# Patient Record
Sex: MALE | Race: WHITE | Employment: FULL TIME | URBAN - METROPOLITAN AREA
[De-identification: names, ages, dates, MRNs, and addresses within clinical notes are randomized per-mention and may not be internally consistent; named-entity substitution may affect disease eponyms.]

---

## 2021-02-17 ENCOUNTER — HOSPITAL ENCOUNTER (OUTPATIENT)
Dept: PHYSICAL THERAPY | Age: 56
Discharge: HOME | End: 2021-02-17
Attending: FAMILY MEDICINE
Payer: COMMERCIAL

## 2021-02-17 DIAGNOSIS — M79.18 GLUTEAL PAIN: ICD-10-CM

## 2021-02-17 PROCEDURE — 97162 PT EVAL MOD COMPLEX 30 MIN: CPT

## 2021-02-17 NOTE — THERAPY EVALUATION
Donovan Rodgers : 1965 Primary: [id number] [facility] P* Secondary:  Therapy Center at [address] [phone]   [phone] OUTPATIENT PHYSICAL THERAPY:Initial Assessment 2021 ICD-10: Treatment Diagnosis: low back pain M54.5 ICD-10: Treatment Diagnosis: Gluteal pain M79.18 Precautions/Allergies:  
Codeine TREATMENT PLAN: 
Effective Dates: 2021 TO 2021 (90 days). Frequency/Duration: 2 times a week for 90 Day(s) MEDICAL/REFERRING DIAGNOSIS: 
Gluteal pain [M79.18] DATE OF ONSET: 5 months REFERRING PHYSICIAN: Martínez Arevalo MD MD Orders: evaluate and treat Return MD Appointment: as needed INITIAL ASSESSMENT:  Mr. Torres Guerrero is a 64 y.o. male who presents to physical therapy with left gluteal pain that started with insidious onset 5 months ago. Notes symptoms are a deep ache into his left gluteal and intermittent tightness in left hamstrings. No radicular symptoms noted into his LLE. Symptoms are aggravated with stairs, climbing ladders and repetitive movements, especially during his golf swing. He presents with left innominate dysfunction, presents with posterior innominate dysfunction, sacral restrictions, soft tissue restrictions in hip rotators and hip flexors. He would benefit from skilled physical therapy to improve overall mobility in lumbar spine and pelvis. PROBLEM LIST (Impacting functional limitations): 1. Decreased Strength 2. Decreased ADL/Functional Activities 3. Decreased Ambulation Ability/Technique 4. Decreased Balance 5. Increased Pain 6. Decreased Activity Tolerance 7. Decreased Flexibility/Joint Mobility INTERVENTIONS PLANNED: (Treatment may consist of any combination of the following) 1. Balance Exercise 2. Gait Training 3. Home Exercise Program (HEP) 4. Manual Therapy 5. Neuromuscular Re-education/Strengthening 6. Range of Motion (ROM) 7. Therapeutic Activites 8. Therapeutic Exercise/Strengthening GOALS: (Goals have been discussed and agreed upon with patient.) Discharge Goals: Time Frame: 90 days 1. Patient demonstrates independence with his HEP without verbal cueing. 2. Patient able to climb stairs and/or ladder without onset of discomfort. 3. Patient able to play 18 holes of golf without onset of left buttocks pain. 4. Improve LEFS outcome measure score from 66/80 to 75/80 to perform ADLs. OUTCOME MEASURE:  
Tool Used: Lower Extremity Functional Scale (LEFS) Score:  Initial: 66/80 Most Recent: X/80 (Date: -- ) Interpretation of Score: 20 questions each scored on a 5 point scale with 0 representing \"extreme difficulty or unable to perform\" and 4 representing \"no difficulty\". The lower the score, the greater the functional disability. 80/80 represents no disability. Minimal detectable change is 9 points. MEDICAL NECESSITY:  
· Patient is expected to demonstrate progress in strength, range of motion, balance, coordination and functional technique to increase independence with ADLs, ability to climb stairs/ladder and return to golf. . 
REASON FOR SERVICES/OTHER COMMENTS: 
· Patient continues to require skilled intervention due to challenged with daily discomfort into his left gluteals . Total Duration: PT Patient Time In/Time Out Time In: 0830 Time Out: 0930 Rehabilitation Potential For Stated Goals: Excellent Regarding Mayra Nuñez's therapy, I certify that the treatment plan above will be carried out by a therapist or under their direction. Thank you for this referral, Anne Zee, PT Referring Physician Signature: Michel Atkins MD              
 
PAIN/SUBJECTIVE:  
Initial: Pain Intensity 1: 5 Pain Location 1: Pelvis Pain Orientation 1: Right  Post Session:  5/10 HISTORY:  
History of Injury/Illness (Reason for Referral): 
 Patient notes discomfort started with an insidious onset of left gluteals and hamstrings about 5-6 months ago. He is challenged with climbing stairs, going up a ladder and playing golf/repetitive movements. Notes symptoms started while golfing. He also notes challenges with prolonged sitting and driving. Patient denies dizziness, drop attacks, numbness/tingling, bowel/bladder dysfunction and/or unexplained weight gain/loss. Past Medical History/Comorbidities:  
Mr. Dora Schmitt  has a past medical history of Acute frontal sinusitis, Depression (1/27/2014), HLD (hyperlipidemia) (1/27/2014), HTN (hypertension) (1/27/2014), Other symptoms referable to back, Other testicular hypofunction, Pain in joint, lower leg, and Shoulder pain. Mr. Dora Schmitt  has a past surgical history that includes hx orthopaedic (Right, 1999); hx orthopaedic (Right, 2012); hx orthopaedic (Right, 12/13); and hx colonoscopy (07/27/2015). Social History/Living Environment:  
  Lives in a private home with his wife. Prior Level of Function/Work/Activity: 
Independent, started working from home in March 2020, new desk set up. Dominant Side:  
      [facility] H2 Model Falls Risk Assessment Risk Factors: 
     (1)  Gender [Male] Ability to Rise from Chair: 
     (0)  Ability to rise in a single movement Falls Prevention Plan: No modifications necessary Total: (5 or greater = High Risk): 1 ©2010 [facility] of [facility]. [facility] Patent #[id number]. Federal Law prohibits the replication, distribution or use without written permission from [facility] [employer] Current Medications:   
  
Current Outpatient Medications:  
  glucosamine-chondroitin (ARTHX) 500-400 mg cap, Take 1 Cap by mouth daily. , Disp: , Rfl:  
  fluticasone propionate (Flonase Allergy Relief) 50 mcg/actuation nasal spray, 2 Sprays by Both Nostrils route daily. , Disp: , Rfl:  
   methylPREDNISolone (MEDROL DOSEPACK) 4 mg tablet, TAD, Disp: 1 Dose Pack, Rfl: 0 
  Omega-3 Fatty Acids 60- mg cpDR, Take  by mouth., Disp: , Rfl:  
  multivitamin (ONE A DAY) tablet, Take 1 Tab by mouth daily. , Disp: , Rfl:  
  loratadine (CLARITIN) 10 mg tablet, Take 10 mg by mouth., Disp: , Rfl:   
Date Last Reviewed:  2/17/2021 Number of Personal Factors/Comorbidities that affect the Plan of Care: 1-2: MODERATE COMPLEXITY EXAMINATION:  
Observation/Orthostatic Postural Assessment:   
      Patient denies any LE pain, paresthesia or symptoms. Patient denies any increase of symptoms with cough, sneeze or valsalva. Patient denies any saddle paresthesia or bowel/bladder deficits. Patient exhibits a neutral lumbar lordosis and neutral thoracic kyphosis. Lower extremity weight bearing is symmetrical. Shoulder symmetry exhibits mild bilateral protraction. Observation of gait indicates decreased pelvic mobility (patient is a hip walker versus an efficient trunk walker). Lower quadrant bony landmarks are left iliac crest elevated, level greater trochanters, lack right knee extension. Leg swing for innominate mobility indicates left restrictions in innominate extension, neutral , ER and IR. Vertical compression test (VCT) for alignment is 3. Elbow flexion test (EFT) for motor activation is 3. Soft tissue observation indicates restrictions in bilateral iliopsoas, rectus femoris, left piriformis, hip rotators. Palpation: Myofascial assessment indicates restrictions in thoracolumbar fascia. Muscle length testing in modified Rei position exhibits restricted left greater than right. Hamstring flexibility tested supine with straight leg raise (SLR) is restricted bilaterally. Piriformis length is restricted left greater than right. Quadriceps flexibility tested prone with heel to buttock is restricted left greater than right. Ely test is positive for rectus femoris tightness. Gastrocnemius and soleus flexibility are WFL. Sacrotuberous ligament is not tested. Sacrococcygeal junction exhibits not tested. Body of coccyx is not tested. ROM: Passive trunk rotation is 80% available to the R and 80% available to the L. Kinetic testing in standing including forward bend test is positive left. Marcher's test is positive left. Pelvic shear test is standing exhibits decreased excursion of left shear with a hard end feel. Quadrant test exhibits excessive movement in L4-5 with extension. Single plane active movements through lumbar spine in standing exhibit restrictions noted in end range of all directions. Prone knee active flexion test is positive left. Spring testing of lumbar spine exhibits decreased a/p glide to L5-S1. Spring testing of sacrum exhibits decreased p/a to left sacral base. Spring testing of innominates exhibits restrictions to left innominate, position in posterior innominate dysfunction. FRS right L4-5 with limitations in extension, rotation and side bending left at L4-5 
AROM (PROM) Right Left Hip flexion/Innominate flexion 90 90 Hip extension/Innominate extension -5 -8 Hip external rotation (ER) 20 20 Hip internal rotation (IR) 10 10 Strength: Lumbar protective mechanism (LPM) are sluggish for initiation. LPM Strength */5  
R anterior to posterior diagonal 2 L anterior to posterior diagonal 2 R posterior to anterior diagonal 3 L posterior to anterior diagonal 3  
 
 Manual Muscle Test (*/5) Right Left Knee extension 5 5 Knee flexion 5 5 Hip flexion 5 5 Hip ER 4 4 Hip IR 4 4 Hip extension 4 4 Hip abduction 4 4 Hip adduction 5 5 Ankle DF 5 5 Ankle PF 5 5 Core stability 4-/5 Special Tests:  Jaquan Neosho test is negative. Scours test is negative bilaterally Neurological Screen: Myotomes: Key muscle strength testing through bilateral LE is intact. Dermatomes: Sensation testing through bilateral lower quadrants for light touch is intact. Reflexes: Patellar (L4) and Achilles (S1) are 2+ and WFL. Neural tension tests: Passive straight leg raise (SLR) test is negative. Slump test is negative. Functional Mobility:  Challenged with prolonged standing and sitting positions, with golf swing, presents with increased mobility to L4-5, limited with left rotation/follow through. Body Structures Involved: 1. Bones 2. Joints 3. Muscles Body Functions Affected: 1. Sensory/Pain 2. Neuromusculoskeletal 
3. Movement Related Activities and Participation Affected: 1. General Tasks and Demands 2. Mobility 3. Community, Social and [facility] [address] Number of elements (examined above) that affect the Plan of Care: 3: MODERATE COMPLEXITY CLINICAL PRESENTATION:  
Presentation: Evolving clinical presentation with changing clinical characteristics: MODERATE COMPLEXITY CLINICAL DECISION MAKING:  
Use of outcome tool(s) and clinical judgement create a POC that gives a: Questionable prediction of patient's progress: MODERATE COMPLEXITY

## 2021-02-24 ENCOUNTER — HOSPITAL ENCOUNTER (OUTPATIENT)
Dept: PHYSICAL THERAPY | Age: 56
Discharge: HOME | End: 2021-02-24
Attending: FAMILY MEDICINE
Payer: COMMERCIAL

## 2021-02-24 PROCEDURE — 97140 MANUAL THERAPY 1/> REGIONS: CPT

## 2021-02-24 PROCEDURE — 97110 THERAPEUTIC EXERCISES: CPT

## 2021-02-25 NOTE — PROGRESS NOTES
Samia Lloyd  : 1965  Primary: [id number] [facility] P*  Secondary:  [address] [place]  [facility] [address] [facility]  [phone]   [phone]      OUTPATIENT PHYSICAL THERAPY: Daily Treatment Note 2021  Visit Count:  2    ICD-10: Treatment Diagnosis: low back pain M54.5  ICD-10: Treatment Diagnosis: Gluteal pain M79.18  Precautions/Allergies:   Codeine   TREATMENT PLAN:  Effective Dates: 2021 TO 2021 (90 days). Frequency/Duration: 2 times a week for 90 Day(s) MEDICAL/REFERRING DIAGNOSIS:  Gluteal pain [M79.18]   DATE OF ONSET: 5 months  REFERRING PHYSICIAN: Maryann De La Rosa MD MD Orders: evaluate and treat  Return MD Appointment       Pre-treatment Symptoms/Complaints: Patient notes no new c/o since evaluation. Notes good and bad days with his left buttock pain. Also notes some tightness into his left hamstrings. Pain: Initial: Pain Intensity 1: 5  Pain Location 1: Pelvis  Pain Orientation 1: Right /10 Post Session:  4/10   Medications Last Reviewed:  2021  Updated Objective Findings:  None Today  TREATMENT:     THERAPEUTIC EXERCISE: (25 minutes):  Exercises per grid below to improve mobility, strength, balance and coordination. Required minimal verbal, manual and tactile cues to promote proper body alignment, promote proper body posture, promote proper body mechanics and promote proper body breathing techniques. Progressed resistance, range, repetitions and complexity of movement as indicated.      Date:  2021   Activity/Exercise Parameters   Supine piriformis stretch X 5 reps, 20 sec holds   Supine bilateral hamstrings and neural glides X 10 reps, 10 sec reps of ankle DF/PF   Supine bilateral 90/90 holds X 3 reps, work through phasic shakes                     MANUAL THERAPY: (30 minutes): Joint mobilization and Soft tissue mobilization was utilized and necessary because of the patient's restricted joint motion, painful spasm, loss of articular motion and restricted motion of soft tissue. (Used abbreviations: MET - muscle energy technique; PNF - proprioceptive neuromuscular facilitation; NMR - neuromuscular re-education; a/p - anterior to posterior; p/a - posterior to anterior, FMP - functional movement patterns, SF - Superficial Fascia; BC - Bony contours; MP - muscle play; IASTM - instrument-assisted soft tissue mobilization)  · Supine bilateral iliopsoas release with FMP of lower trunk rotation  · Prone SF and BC along iliac crest and sacral sulcus with FMP of lower trunk rotation  · Prone hip on axis mobilization of left LE, followed with NMR into hip ER/IR      [facility] Portal  Treatment/Session Summary:    · Response to Treatment:  improved awareness of mechanical dysfunctions. Challenged with core engagement, presents with significant phasic shakes through his LE. · Communication/Consultation:  None today  · Equipment provided today:  None today  · Recommendations/Intent for next treatment session: Next visit will focus on core engagement, pelvic and hip joint mobilizations and NMR.  .    Total Treatment Billable Duration:  55 minutes  PT Patient Time In/Time Out  Time In: 0830  Time Out: 100 Josue Man PT    Future Appointments   Date Time Provider [facility]   3/1/2021  9:30 AM Lorrane Proffer A., PT [facility]   3/3/2021  8:30 AM Whitehall Sink., PT [facility]   3/8/2021  4:30 PM Lorrane Proffer A., PT [facility]   3/10/2021  8:30 AM Whitehall Sink., PT [facility]   3/15/2021  4:30 PM Lorrane Proffer A., PT [facility]   3/17/2021  8:30 AM Nakia Sink., PT [facility]   3/22/2021  4:30 PM Lorrane Proffer A., PT [facility]   3/24/2021  8:30 AM Whitehall Sink., PT [facility]   3/29/2021  4:30 PM Lorrane Proffer A., PT [facility]   3/31/2021  8:30 AM Lorrane Proffer A., PT [facility]   9/3/2021  7:30 AM [facility] 9/8/2021  8:00 AM Celestina Lloyd MD [facility]

## 2021-03-01 ENCOUNTER — HOSPITAL ENCOUNTER (OUTPATIENT)
Dept: PHYSICAL THERAPY | Age: 56
Discharge: HOME | End: 2021-03-01
Attending: FAMILY MEDICINE
Payer: COMMERCIAL

## 2021-03-01 PROCEDURE — 97140 MANUAL THERAPY 1/> REGIONS: CPT

## 2021-03-01 PROCEDURE — 97110 THERAPEUTIC EXERCISES: CPT

## 2021-03-01 NOTE — PROGRESS NOTES
Nat Ortiz  : 1965  Primary: [address] at [place] [address], [address] [facility]  [phone]   [phone]      OUTPATIENT PHYSICAL THERAPY: Daily Treatment Note 3/1/2021  Visit Count:  3    ICD-10: Treatment Diagnosis: low back pain M54.5  ICD-10: Treatment Diagnosis: Gluteal pain M79.18  Precautions/Allergies:   Codeine   TREATMENT PLAN:  Effective Dates: 2021 TO 2021 (90 days). Frequency/Duration: 2 times a week for 90 Day(s) MEDICAL/REFERRING DIAGNOSIS:  Gluteal pain [M79.18]   DATE OF ONSET: 5 months  REFERRING PHYSICIAN: David Costa MD MD Orders: evaluate and treat  Return MD Appointment       Pre-treatment Symptoms/Complaints: Patient notes no major changes in symptoms, has noted improvements in amount of flexibility with piriformis stretch. Pain: Initial: Pain Intensity 1: 5  Pain Location 1: Pelvis  Pain Orientation 1: Right /10 Post Session:  4/10   Medications Last Reviewed:  3/1/2021  Updated Objective Findings:  challenged with pelvic position in sitting, increased lumbar spine extension noted. restrictions noted in left gluteals and hip rotators  TREATMENT:     THERAPEUTIC EXERCISE: (25 minutes):  Exercises per grid below to improve mobility, strength, balance and coordination. Required minimal verbal, manual and tactile cues to promote proper body alignment, promote proper body posture, promote proper body mechanics and promote proper body breathing techniques. Progressed resistance, range, repetitions and complexity of movement as indicated.      Date:  3/1/2021   Activity/Exercise Parameters   Supine piriformis stretch X 5 reps, 20 sec holds   Supine bilateral hamstrings and neural glides X 10 reps, 10 sec reps of ankle DF/PF   Supine bilateral 90/90 holds X 3 reps, work through phasic shakes   Seated posture training X 15 minutes review of neutral pelvis, hip hinging, base of support. Side lying hip abduction X 10 reps, 10 sec holds BLE             MANUAL THERAPY: (30 minutes): Joint mobilization and Soft tissue mobilization was utilized and necessary because of the patient's restricted joint motion, painful spasm, loss of articular motion and restricted motion of soft tissue. (Used abbreviations: MET - muscle energy technique; PNF - proprioceptive neuromuscular facilitation; NMR - neuromuscular re-education; a/p - anterior to posterior; p/a - posterior to anterior, FMP - functional movement patterns, SF - Superficial Fascia; BC - Bony contours; MP - muscle play; IASTM - instrument-assisted soft tissue mobilization)  · Supine bilateral iliopsoas release with FMP of lower trunk rotation  · Prone SF and BC along iliac crest and sacral sulcus with FMP of lower trunk rotation  · Prone hip on axis mobilization of left LE, followed with NMR into hip ER/IR  · Prone tool assisted soft tissue techniques to left hip rotators followed with NMR      [facility]  Treatment/Session Summary:    · Response to Treatment:  demonstrated improved awareness of sitting postures. improved left hip mobility after tool assisted techniques. · Communication/Consultation:  None today  · Equipment provided today:  None today  · Recommendations/Intent for next treatment session: Next visit will focus on core engagement, pelvic and hip joint mobilizations and NMR.  .    Total Treatment Billable Duration:  55 minutes  PT Patient Time In/Time Out  Time In: 0930  Time Out: [facility],     Future Appointments   Date Time Provider [facility]   3/3/2021  8:30 AM Kendrick Harvey, PT [facility]   3/8/2021  4:30 PM Inga [facility]ICHOLE, PT [facility]   3/10/2021  8:30 AM Kendrick Harvey, PT [facility]   3/15/2021  4:30 PM Kendrick Harvey, PT [facility]   3/17/2021  8:30 AM Kendrick Delgado., PT [facility]   3/22/2021  4:30 PM Kendrick Harvey, PT [facility] 3/24/2021  8:30 AM Sheba [facility]HOLE, [facility]   3/29/2021  4:30 PM Taiwo Ballesteros., PT [facility]   3/31/2021  8:30 AM Sheba [facility]LE, PT [facility]   9/3/2021  7:30 AM [facility] LAB RESOURCE [facility]   9/8/2021  8:00 AM Temo Cano MD [facility]

## 2021-03-03 ENCOUNTER — HOSPITAL ENCOUNTER (OUTPATIENT)
Dept: PHYSICAL THERAPY | Age: 56
Discharge: HOME | End: 2021-03-03
Attending: FAMILY MEDICINE
Payer: COMMERCIAL

## 2021-03-03 PROCEDURE — 97140 MANUAL THERAPY 1/> REGIONS: CPT

## 2021-03-03 PROCEDURE — 97110 THERAPEUTIC EXERCISES: CPT

## 2021-03-03 NOTE — PROGRESS NOTES
Racquel Perez  : 1965  Primary: Yen [address] at [place] [address], [address] [phone]   [phone]      OUTPATIENT PHYSICAL THERAPY: Daily Treatment Note 3/3/2021  Visit Count:  4    ICD-10: Treatment Diagnosis: low back pain M54.5  ICD-10: Treatment Diagnosis: Gluteal pain M79.18  Precautions/Allergies:   Codeine   TREATMENT PLAN:  Effective Dates: 2021 TO 2021 (90 days). Frequency/Duration: 2 times a week for 90 Day(s) MEDICAL/REFERRING DIAGNOSIS:  Gluteal pain [M79.18]   DATE OF ONSET: 5 months  REFERRING PHYSICIAN: Stephanie Ye MD MD Orders: evaluate and treat  Return MD Appointment       Pre-treatment Symptoms/Complaints: Patient notes some relief after last session, however only lasted for a day or 2. Stretches are going well, more observant of sitting position at work desk. Pain: Initial: Pain Intensity 1: 5  Pain Location 1: Pelvis  Pain Orientation 1: Right /10 Post Session:  4/10   Medications Last Reviewed:  3/3/2021  Updated Objective Findings:  challenged with pelvic position in sitting, increased lumbar spine extension noted. restrictions noted in left gluteals and hip rotators  TREATMENT:     THERAPEUTIC EXERCISE: (25 minutes):  Exercises per grid below to improve mobility, strength, balance and coordination. Required minimal verbal, manual and tactile cues to promote proper body alignment, promote proper body posture, promote proper body mechanics and promote proper body breathing techniques. Progressed resistance, range, repetitions and complexity of movement as indicated.      Date:  3/3/2021   Activity/Exercise Parameters   Supine piriformis stretch X 5 reps, 20 sec holds   Supine bilateral hamstrings and neural glides X 10 reps, 10 sec reps of ankle DF/PF   Supine bilateral 90/90 holds X 3 reps, work through phasic shakes   Seated posture training X 15 minutes review of neutral pelvis, hip hinging, base of support. Side lying hip abduction X 10 reps, 10 sec holds BLE   Hip Flexor stretch: supine and kneeling X 5 minute review of both positions and techniques   X 5 reps, 20 sec holds          MANUAL THERAPY: (30 minutes): Joint mobilization and Soft tissue mobilization was utilized and necessary because of the patient's restricted joint motion, painful spasm, loss of articular motion and restricted motion of soft tissue. (Used abbreviations: MET - muscle energy technique; PNF - proprioceptive neuromuscular facilitation; NMR - neuromuscular re-education; a/p - anterior to posterior; p/a - posterior to anterior, FMP - functional movement patterns, SF - Superficial Fascia; BC - Bony contours; MP - muscle play; IASTM - instrument-assisted soft tissue mobilization)  · Supine bilateral iliopsoas release with FMP of lower trunk rotation  · Prone SF and BC along iliac crest and sacral sulcus with FMP of lower trunk rotation  · Prone hip on axis mobilization of left LE, followed with NMR into hip ER/IR  · Prone soft tissue mobilization to left hip rotators and hamstrings strumming with FMP of knee flexion/extension      [facility]  Treatment/Session Summary:    · Response to Treatment:  restrictions noted in left iliopsoas compared to right, lacks 10 degrees of extension of left hip in supine nadine test position. · Communication/Consultation:  None today  · Equipment provided today:  None today  · Recommendations/Intent for next treatment session: Next visit will focus on core engagement, pelvic and hip joint mobilizations and NMR.  .    Total Treatment Billable Duration:  55 minutes  PT Patient Time In/Time Out  Time In: 0830  Time Out: [facility] Ava Kim PT    Future Appointments   Date Time Provider [facility] Parish   3/8/2021  4:30 PM Adolph Maier., PT [facility]   3/10/2021  8:30 AM Adolph Maier., PT [facility]   3/15/2021  4:30 PM Dev Mims A., PT [facility]   3/17/2021  8:30 AM Rios Quest A., PT [facility]   3/22/2021  4:30 PM Rios Quest A., PT [facility]   3/24/2021  8:30 AM Lenton Austell., PT [facility]   3/29/2021  4:30 PM Lenton Austell., PT [facility]   3/31/2021  8:30 AM Rios Quest A., PT [facility]   9/3/2021  7:30 AM [facility]   9/8/2021  8:00 AM Terrance Wei MD [facility]

## 2021-03-08 ENCOUNTER — HOSPITAL ENCOUNTER (OUTPATIENT)
Dept: PHYSICAL THERAPY | Age: 56
Discharge: HOME | End: 2021-03-08
Attending: FAMILY MEDICINE
Payer: COMMERCIAL

## 2021-03-08 PROCEDURE — 97140 MANUAL THERAPY 1/> REGIONS: CPT

## 2021-03-08 PROCEDURE — 97110 THERAPEUTIC EXERCISES: CPT

## 2021-03-09 NOTE — PROGRESS NOTES
Kathleen Santiago  : 1965  Primary: Yen 82 at [place] [address], [address] [facility]  [phone]   [phone]      OUTPATIENT PHYSICAL THERAPY: Daily Treatment Note 3/8/2021  Visit Count:  5    ICD-10: Treatment Diagnosis: low back pain M54.5  ICD-10: Treatment Diagnosis: Gluteal pain M79.18  Precautions/Allergies:   Codeine   TREATMENT PLAN:  Effective Dates: 2021 TO 2021 (90 days). Frequency/Duration: 2 times a week for 90 Day(s) MEDICAL/REFERRING DIAGNOSIS:  Gluteal pain [M79.18]   DATE OF ONSET: 5 months  REFERRING PHYSICIAN: Cira Eckert MD MD Orders: evaluate and treat  Return MD Appointment       Pre-treatment Symptoms/Complaints: Patient notes stretches have created soreness in his left gluteal region. Pain: Initial: Pain Intensity 1: 5  Pain Location 1: Pelvis  Pain Orientation 1: Right /10 Post Session:  0/10   Medications Last Reviewed:  3/8/2021  Updated Objective Findings:  challenged with pelvic position in sitting, increased lumbar spine extension noted. restrictions noted in left gluteals and hip rotators  TREATMENT:     THERAPEUTIC EXERCISE: (25 minutes):  Exercises per grid below to improve mobility, strength, balance and coordination. Required minimal verbal, manual and tactile cues to promote proper body alignment, promote proper body posture, promote proper body mechanics and promote proper body breathing techniques. Progressed resistance, range, repetitions and complexity of movement as indicated. Date:  3/8/2021   Activity/Exercise Parameters   Supine piriformis stretch X 5 reps, 20 sec holds   Supine bilateral hamstrings and neural glides X 10 reps, 10 sec reps of ankle DF/PF   Supine bilateral 90/90 holds X 3 reps, work through phasic shakes   Seated posture training X 15 minutes review of neutral pelvis, hip hinging, base of support.    Side lying hip abduction X 10 reps, 10 sec holds BLE, review technique on wall with posterior depression of pelvis   Hip Flexor stretch: supine and kneeling X 5 minute review of both positions and techniques   X 5 reps, 20 sec holds    High knees on wall X  10 reps, 5 sec holds     MANUAL THERAPY: (30 minutes): Joint mobilization and Soft tissue mobilization was utilized and necessary because of the patient's restricted joint motion, painful spasm, loss of articular motion and restricted motion of soft tissue. (Used abbreviations: MET - muscle energy technique; PNF - proprioceptive neuromuscular facilitation; NMR - neuromuscular re-education; a/p - anterior to posterior; p/a - posterior to anterior, FMP - functional movement patterns, SF - Superficial Fascia; BC - Bony contours; MP - muscle play; IASTM - instrument-assisted soft tissue mobilization)  · Supine bilateral iliopsoas release with FMP of lower trunk rotation  · Prone SF and BC along iliac crest and sacral sulcus with FMP of lower trunk rotation  · Prone hip on axis mobilization of left LE, followed with NMR into hip ER/IR  · Prone tool assisted soft tissue techniques to left hip abductors and quadratus femoris, with FMP and NMR to hip rotators. · Side lying right for left posterior depression of pelvis prolonged holds and combination of isotonics. · Prone soft tissue mobilization to left hip rotators and hamstrings strumming with FMP of knee flexion/extension      [facility]  Treatment/Session Summary:    · Response to Treatment: No pain noted at end of session with active movements into his left gluteal region. · Communication/Consultation:  None today  · Equipment provided today:  None today  · Recommendations/Intent for next treatment session: Next visit will focus on core engagement, pelvic and hip joint mobilizations and NMR.  .    Total Treatment Billable Duration:  55 minutes  PT Patient Time In/Time Out  Time In: 6260  Time Out: ERICH Hernandez    Future Appointments   Date Time Provider [facility] Марина   3/10/2021  8:30 AM Maty Province A., PT [facility]   3/15/2021  4:30 PM Maty Province A., PT [facility]   3/17/2021  8:30 AM Janiya Flank., PT [facility]   3/22/2021  4:30 PM Janiya Flank., PT [facility]   3/24/2021  8:30 AM Janiya Flank., PT [facility]   3/29/2021  4:30 PM Janiya Flank., PT [facility]   3/31/2021  8:30 AM Maty Province A., PT [facility]   9/3/2021  7:30 AM [facility]F LAB RESOURCE [facility]   9/8/2021  8:00 AM Shelly West MD [facility]F [facility]F

## 2021-03-10 ENCOUNTER — HOSPITAL ENCOUNTER (OUTPATIENT)
Dept: PHYSICAL THERAPY | Age: 56
Discharge: HOME | End: 2021-03-10
Attending: FAMILY MEDICINE
Payer: COMMERCIAL

## 2021-03-10 PROCEDURE — 97110 THERAPEUTIC EXERCISES: CPT

## 2021-03-10 PROCEDURE — 97140 MANUAL THERAPY 1/> REGIONS: CPT

## 2021-03-10 NOTE — PROGRESS NOTES
Alaina Tarango  : 1965  Primary: Yen 82 at [place] [address], [address] [place] [address]  [phone]   [phone]      OUTPATIENT PHYSICAL THERAPY: Daily Treatment Note 3/10/2021  Visit Count:  6    ICD-10: Treatment Diagnosis: low back pain M54.5  ICD-10: Treatment Diagnosis: Gluteal pain M79.18  Precautions/Allergies:   Codeine   TREATMENT PLAN:  Effective Dates: 2021 TO 2021 (90 days). Frequency/Duration: 2 times a week for 90 Day(s) MEDICAL/REFERRING DIAGNOSIS:  Gluteal pain [M79.18]   DATE OF ONSET: 5 months  REFERRING PHYSICIAN: Tyler Orellana MD MD Orders: evaluate and treat  Return MD Appointment       Pre-treatment Symptoms/Complaints: Patient notes less discomfort noted in left buttocks, continues to note tightness in hamstrings. Pain: Initial: Pain Intensity 1: 4  Pain Location 1: Pelvis  Pain Orientation 1: Right /10 Post Session:  0/10   Medications Last Reviewed:  3/10/2021  Updated Objective Findings:  challenged with pelvic position in sitting, increased lumbar spine extension noted. restrictions noted in left gluteals and hip rotators  TREATMENT:     THERAPEUTIC EXERCISE: (25 minutes):  Exercises per grid below to improve mobility, strength, balance and coordination. Required minimal verbal, manual and tactile cues to promote proper body alignment, promote proper body posture, promote proper body mechanics and promote proper body breathing techniques. Progressed resistance, range, repetitions and complexity of movement as indicated. Date:  3/10/2021   Activity/Exercise Parameters   Supine piriformis stretch X 5 reps, 20 sec holds   Supine bilateral hamstrings and neural glides X 10 reps, 10 sec reps of ankle DF/PF   Supine bilateral 90/90 holds X 3 reps, work through phasic shakes   Seated posture training X 15 minutes review of neutral pelvis, hip hinging, base of support.    Side lying hip abduction X 10 reps, 10 sec holds BLE, review technique on wall with posterior depression of pelvis   Hip Flexor stretch: supine and kneeling X 5 minute review of both positions and techniques   X 5 reps, 20 sec holds    High knees on wall X  10 reps, 5 sec holds     MANUAL THERAPY: (30 minutes): Joint mobilization and Soft tissue mobilization was utilized and necessary because of the patient's restricted joint motion, painful spasm, loss of articular motion and restricted motion of soft tissue. (Used abbreviations: MET - muscle energy technique; PNF - proprioceptive neuromuscular facilitation; NMR - neuromuscular re-education; a/p - anterior to posterior; p/a - posterior to anterior, FMP - functional movement patterns, SF - Superficial Fascia; BC - Bony contours; MP - muscle play; IASTM - instrument-assisted soft tissue mobilization)  · Supine bilateral iliopsoas release with FMP of lower trunk rotation  · Supine bilateral hamstrings soft tissue mobilization with FMP of knee extension. · Prone SF and BC along iliac crest and sacral sulcus with FMP of lower trunk rotation  · Prone hip on axis mobilization of left LE, followed with NMR into hip ER/IR  · Prone tool assisted soft tissue techniques to left hip abductors and quadratus femoris, with FMP and NMR to hip rotators. · Side lying right for left posterior depression of pelvis prolonged holds and combination of isotonics. · Prone soft tissue mobilization to left hip rotators and hamstrings strumming with FMP of knee flexion/extension      [facility] Portal  Treatment/Session Summary:    · Response to Treatment: continues to present with restrictions in bilateral hamstrings and limited right knee extension. · Communication/Consultation:  None today  · Equipment provided today:  None today  · Recommendations/Intent for next treatment session: Next visit will focus on core engagement, pelvic and hip joint mobilizations and NMR.  .    Total Treatment Billable Duration:  55 minutes  PT Patient Time In/Time Out  Time In: 0830  Time Out: 100 Josue Man, PT    Future Appointments   Date Time Provider Brady Parish   3/15/2021  4:30 PM Dinora Barges., PT [facility]   3/17/2021  8:30 AM Dinora Barges., PT [facility]   3/22/2021  4:30 PM Dinora Barges., PT [facility]   3/24/2021  8:30 AM Dinora Barges., PT [facility]   3/29/2021  4:30 PM Dinora Barges., PT [facility]   3/31/2021  8:30 AM Stanislav NICHOLE, PT [facility]   9/3/2021  7:30 [facility]   9/8/2021  8:00 AM Celestina Lloyd MD [facility]

## 2021-03-15 ENCOUNTER — HOSPITAL ENCOUNTER (OUTPATIENT)
Dept: PHYSICAL THERAPY | Age: 56
Discharge: HOME | End: 2021-03-15
Attending: FAMILY MEDICINE
Payer: COMMERCIAL

## 2021-03-15 PROCEDURE — 97140 MANUAL THERAPY 1/> REGIONS: CPT

## 2021-03-15 PROCEDURE — 97110 THERAPEUTIC EXERCISES: CPT

## 2021-03-16 NOTE — PROGRESS NOTES
Eloina Marrow  : 1965  Primary: Yen Maya at [place] [address], [address] [facility]  [phone]   [phone]      OUTPATIENT PHYSICAL THERAPY: Daily Treatment Note 3/15/2021  Visit Count:  7    ICD-10: Treatment Diagnosis: low back pain M54.5  ICD-10: Treatment Diagnosis: Gluteal pain M79.18  Precautions/Allergies:   Codeine   TREATMENT PLAN:  Effective Dates: 2021 TO 2021 (90 days). Frequency/Duration: 2 times a week for 90 Day(s) MEDICAL/REFERRING DIAGNOSIS:  Gluteal pain [M79.18]   DATE OF ONSET: 5 months  REFERRING PHYSICIAN: Maximo Feliz MD MD Orders: evaluate and treat  Return MD Appointment       Pre-treatment Symptoms/Complaints: Patient notes left buttock pain has returned to original pain levels since last PT visit. Pain: Initial: Pain Intensity 1: 6  Pain Location 1: Pelvis  Pain Orientation 1: Right /10 Post Session:  0/10   Medications Last Reviewed:  3/15/2021  Updated Objective Findings:  challenged with pelvic position in sitting, increased lumbar spine extension noted. restrictions noted in left gluteals and hip rotators  TREATMENT:     THERAPEUTIC EXERCISE: (15 minutes):  Exercises per grid below to improve mobility, strength, balance and coordination. Required minimal verbal, manual and tactile cues to promote proper body alignment, promote proper body posture, promote proper body mechanics and promote proper body breathing techniques. Progressed resistance, range, repetitions and complexity of movement as indicated.      Date:  3/15/2021   Activity/Exercise Parameters   Supine piriformis stretch X 5 reps, 20 sec holds   Supine bilateral hamstrings and neural glides X 10 reps, 10 sec reps of ankle DF/PF   Supine bilateral 90/90 holds X 3 reps, work through phasic shakes   Seated posture training    Side lying hip abduction X 10 reps, 10 sec holds BLE, review technique on wall with posterior depression of pelvis   Hip Flexor stretch: supine and kneeling X 5 minute review of both positions and techniques   X 5 reps, 20 sec holds    High knees on wall X  10 reps, 5 sec holds     MANUAL THERAPY: (40 minutes): Joint mobilization and Soft tissue mobilization was utilized and necessary because of the patient's restricted joint motion, painful spasm, loss of articular motion and restricted motion of soft tissue. (Used abbreviations: MET - muscle energy technique; PNF - proprioceptive neuromuscular facilitation; NMR - neuromuscular re-education; a/p - anterior to posterior; p/a - posterior to anterior, FMP - functional movement patterns, SF - Superficial Fascia; BC - Bony contours; MP - muscle play; IASTM - instrument-assisted soft tissue mobilization)  · Supine bilateral iliopsoas release with FMP of lower trunk rotation  · Supine bilateral hamstrings soft tissue mobilization with FMP of knee extension. · Prone SF and BC along iliac crest and sacral sulcus with FMP of lower trunk rotation  · Prone hip on axis mobilization of left LE, followed with NMR into hip ER/IR  · Prone tool assisted soft tissue techniques to left hip abductors   · Side lying right for left posterior depression of pelvis prolonged holds and combination of isotonics. · Prone soft tissue mobilization to left hip rotators and hamstrings strumming with FMP of knee flexion/extension      [facility] Portal  Treatment/Session Summary:    · Response to Treatment: focus on soft tissue release of hip flexors and activation of hip abduction on his left side. Improvements noted in ROM and pain levels by end of session. · Communication/Consultation:  None today  · Equipment provided today:  None today  · Recommendations/Intent for next treatment session: Next visit will focus on core engagement, pelvic and hip joint mobilizations and NMR.  .    Total Treatment Billable Duration:  55 minutes  PT Patient Time In/Time Out  Time In: 1630  Time Out: ERICH Hernandez    Future Appointments   Date Time Provider Brady Марина   3/17/2021  8:30 AM Carolyn Rodrigues, PT [facility]   3/22/2021  4:30 PM Arnoldo NICHOLE, PT [facility]   3/24/2021  8:30 AM Carolyn Rodrigues, PT [facility]   3/29/2021  4:30 PM Carolyn Rodrigues, PT [facility]   3/31/2021  8:30 AM Arnoldo NICHOLE, PT    9/3/2021  7:30 AM [facility]   9/8/2021  8:00 AM [facility]

## 2021-03-17 ENCOUNTER — HOSPITAL ENCOUNTER (OUTPATIENT)
Dept: PHYSICAL THERAPY | Age: 56
Discharge: HOME | End: 2021-03-17
Attending: FAMILY MEDICINE
Payer: COMMERCIAL

## 2021-03-17 PROCEDURE — 97140 MANUAL THERAPY 1/> REGIONS: CPT

## 2021-03-17 PROCEDURE — 97110 THERAPEUTIC EXERCISES: CPT

## 2021-03-18 NOTE — PROGRESS NOTES
Leigh Vincent  : 1965  Primary: Yen 82 at [place] [address], [address] [facility]  [phone]   [phone]      OUTPATIENT PHYSICAL THERAPY: Daily Treatment Note 3/17/2021  Visit Count:  8    ICD-10: Treatment Diagnosis: low back pain M54.5  ICD-10: Treatment Diagnosis: Gluteal pain M79.18  Precautions/Allergies:   Codeine   TREATMENT PLAN:  Effective Dates: 2021 TO 2021 (90 days). Frequency/Duration: 2 times a week for 90 Day(s) MEDICAL/REFERRING DIAGNOSIS:  Gluteal pain [M79.18]   DATE OF ONSET: 5 months  REFERRING PHYSICIAN: Divina Riojas MD MD Orders: evaluate and treat  Return MD Appointment       Pre-treatment Symptoms/Complaints: Patient notes slight improvements since Monday with less discomfort in his left gluteals, however continues to note most discomfort on his left sit bone. Pain: Initial: Pain Intensity 1: 5  Pain Location 1: Pelvis  Pain Orientation 1: Left /10 Post Session:  3/10   Medications Last Reviewed:  3/17/2021  Updated Objective Findings:  challenged with pelvic position in sitting, increased lumbar spine extension noted. restrictions noted in left gluteals and hip rotators  TREATMENT:     THERAPEUTIC EXERCISE: (25 minutes):  Exercises per grid below to improve mobility, strength, balance and coordination. Required minimal verbal, manual and tactile cues to promote proper body alignment, promote proper body posture, promote proper body mechanics and promote proper body breathing techniques. Progressed resistance, range, repetitions and complexity of movement as indicated.      Date:  3/17/2021   Activity/Exercise Parameters   Supine piriformis stretch X 5 reps, 20 sec holds   Supine bilateral hamstrings and neural glides X 10 reps, 10 sec reps of ankle DF/PF   Supine bilateral 90/90 holds X 3 reps, work through phasic shakes   Seated posture training    Side lying hip abduction X 10 reps, 10 sec holds BLE, review technique on wall with posterior depression of pelvis   Hip Flexor stretch: supine and kneeling X 5 minute review of both positions and techniques   X 5 reps, 20 sec holds    High knees on wall X  10 reps, 5 sec holds   Wall calf stretch X 5 reps, 30 sec holds   Elle pose X 5 reps, 20 sec holds     MANUAL THERAPY: (30 minutes): Joint mobilization and Soft tissue mobilization was utilized and necessary because of the patient's restricted joint motion, painful spasm, loss of articular motion and restricted motion of soft tissue. (Used abbreviations: MET - muscle energy technique; PNF - proprioceptive neuromuscular facilitation; NMR - neuromuscular re-education; a/p - anterior to posterior; p/a - posterior to anterior, FMP - functional movement patterns, SF - Superficial Fascia; BC - Bony contours; MP - muscle play; IASTM - instrument-assisted soft tissue mobilization)  · Supine bilateral iliopsoas release with FMP of lower trunk rotation  · Prone bilateral hamstrings soft tissue mobilization with FMP of knee extension. · Prone SF and BC along iliac crest and sacral sulcus with FMP of lower trunk rotation  · Prone hip on axis mobilization of left LE, followed with NMR into hip ER/IR  · Side lying right for left posterior depression of pelvis prolonged holds and combination of isotonics. · Prone soft tissue mobilization to left hip rotators and hamstrings strumming with FMP of knee flexion/extension  · Supine right knee p/a mobilization  · Prone right hamstrings and calf soft tissue mobilization with FMP of knee flexion/extension. [facility] Portal  Treatment/Session Summary:    · Response to Treatment: improved right knee extension with decreased soft tissue restrictions in right posterior leg. Improved bilateral innominate flexion and extension bilaterally.    · Communication/Consultation:  None today  · Equipment provided today:  None today  · Recommendations/Intent for next treatment session: Next visit will focus on core engagement, pelvic and hip joint mobilizations and NMR.  .    Total Treatment Billable Duration:  55 minutes  PT Patient Time In/Time Out  Time In: 0830  Time Out: 100 Josue Mna PT    Future Appointments   Date Time Provider Brady Parish   3/22/2021  4:30 PM Laurel Velazquez, PT [facility]   3/24/2021  8:30 AM Laurel Velazquez, PT OFF [facility]   3/29/2021  4:30 PM Laurel Velazquez, PT [facility]   3/31/2021  8:30 AM Nan[facility]ICHOLE, [facility]   9/3/2021  7:30 AM HTF LAB RESOURCE [facility]   9/8/2021  8:00 AM Jax Smith MD [facility] HTF

## 2021-03-22 ENCOUNTER — HOSPITAL ENCOUNTER (OUTPATIENT)
Dept: PHYSICAL THERAPY | Age: 56
Discharge: HOME | End: 2021-03-22
Attending: FAMILY MEDICINE
Payer: COMMERCIAL

## 2021-03-22 PROCEDURE — 97110 THERAPEUTIC EXERCISES: CPT

## 2021-03-22 PROCEDURE — 97140 MANUAL THERAPY 1/> REGIONS: CPT

## 2021-03-23 NOTE — PROGRESS NOTES
Eloina Marrow  : 1965  Primary: Yen Maya at [facility], [address]  [phone]   [phone]      OUTPATIENT PHYSICAL THERAPY: Daily Treatment Note 3/22/2021  Visit Count:  9    ICD-10: Treatment Diagnosis: low back pain M54.5  ICD-10: Treatment Diagnosis: Gluteal pain M79.18  Precautions/Allergies:   Codeine   TREATMENT PLAN:  Effective Dates: 2021 TO 2021 (90 days). Frequency/Duration: 2 times a week for 90 Day(s) MEDICAL/REFERRING DIAGNOSIS:  Gluteal pain [M79.18]   DATE OF ONSET: 5 months  REFERRING PHYSICIAN: Maximo Feliz MD MD Orders: evaluate and treat  Return MD Appointment       Pre-treatment Symptoms/Complaints: Patient notes continued discomfort in left sit bone. Notes exercises are going well at home. Pain: Initial: Pain Intensity 1: 4  Pain Location 1: Pelvis  Pain Orientation 1: Left /10 Post Session:  3/10   Medications Last Reviewed:  3/22/2021  Updated Objective Findings:  challenged with pelvic position in sitting, increased lumbar spine extension noted. restrictions noted in left gluteals and hip rotators  TREATMENT:     THERAPEUTIC EXERCISE: (25 minutes):  Exercises per grid below to improve mobility, strength, balance and coordination. Required minimal verbal, manual and tactile cues to promote proper body alignment, promote proper body posture, promote proper body mechanics and promote proper body breathing techniques. Progressed resistance, range, repetitions and complexity of movement as indicated.      Date:  3/22/2021   Activity/Exercise Parameters   Supine piriformis stretch X 5 reps, 20 sec holds   Supine bilateral hamstrings and neural glides X 10 reps, 10 sec reps of ankle DF/PF   Supine bilateral 90/90 holds X 3 reps, work through phasic shakes   Seated posture training    Side lying hip abduction X 10 reps, 10 sec holds BLE, review technique on wall with posterior depression of pelvis   Hip Flexor stretch: supine and kneeling X 5 minute review of both positions and techniques   X 5 reps, 20 sec holds    High knees on wall X  10 reps, 5 sec holds   Wall calf stretch X 5 reps, 30 sec holds   Elle pose X 5 reps, 20 sec holds   High knees, soldier kicks and rollerblade: for trunk movement X 10 minute review of techniques     MANUAL THERAPY: (30 minutes): Joint mobilization and Soft tissue mobilization was utilized and necessary because of the patient's restricted joint motion, painful spasm, loss of articular motion and restricted motion of soft tissue. (Used abbreviations: MET - muscle energy technique; PNF - proprioceptive neuromuscular facilitation; NMR - neuromuscular re-education; a/p - anterior to posterior; p/a - posterior to anterior, FMP - functional movement patterns, SF - Superficial Fascia; BC - Bony contours; MP - muscle play; IASTM - instrument-assisted soft tissue mobilization)  · Supine bilateral iliopsoas release with FMP of lower trunk rotation  · Prone bilateral hamstrings soft tissue mobilization with FMP of knee extension. · Prone SF and BC along iliac crest and sacral sulcus with FMP of lower trunk rotation  · Prone hip on axis mobilization of left LE, followed with NMR into hip ER/IR  · Side lying right for left posterior depression of pelvis prolonged holds and combination of isotonics. · Prone soft tissue mobilization to left hip rotators and hamstrings strumming with FMP of knee flexion/extension  · Supine right knee p/a mobilization  · Prone right hamstrings and calf soft tissue mobilization with FMP of knee flexion/extension. [facility] Portal  Treatment/Session Summary:    · Response to Treatment: decreased soft tissue restrictions in bilateral hamstrings. Improving right knee extension after manual techniques today.    · Communication/Consultation:  None today  · Equipment provided today:  None today  · Recommendations/Intent for next treatment session: Next visit will focus on core engagement, pelvic and hip joint mobilizations and NMR.  .    Total Treatment Billable Duration:  55 minutes  PT Patient Time In/Time Out  Time In: 1630  Time Out: ERICH Hernandez    Future Appointments   Date Time Provider Brady Parish   3/24/2021  8:30 AM Phillip Garcia., PT [facility]   3/29/2021  4:30 PM Arsenio NICHOLE, PT [facility]   3/31/2021  8:30 AM Arsenio NICHOLE, PT OFF [facility]   9/3/2021  7:30 AM HTF LAB RESOURCE [facility] HTF HTF   9/8/2021  8:00 AM Bailee Chacon MD [facility] HTF HTF

## 2021-03-24 ENCOUNTER — HOSPITAL ENCOUNTER (OUTPATIENT)
Dept: PHYSICAL THERAPY | Age: 56
Discharge: HOME | End: 2021-03-24
Attending: FAMILY MEDICINE
Payer: COMMERCIAL

## 2021-03-24 PROCEDURE — 97110 THERAPEUTIC EXERCISES: CPT

## 2021-03-24 PROCEDURE — 97140 MANUAL THERAPY 1/> REGIONS: CPT

## 2021-03-25 NOTE — PROGRESS NOTES
Casey Fu  : 1965  Primary: Yen Maya at [facility], [address] [facility]  [phone]   [phone]          OUTPATIENT PHYSICAL THERAPY:Progress Report 3/24/2021   ICD-10: Treatment Diagnosis: low back pain M54.5  ICD-10: Treatment Diagnosis: Gluteal pain M79.18  Precautions/Allergies:   Codeine   TREATMENT PLAN:  Effective Dates: 2021 TO 2021 (90 days). Frequency/Duration: 2 times a week for 90 Day(s) MEDICAL/REFERRING DIAGNOSIS:  Myalgia, other site [M79.18]   DATE OF ONSET: 5 months  REFERRING PHYSICIAN: Jax Smith MD MD Orders: evaluate and treat  Return MD Appointment: as needed     PROGRESS NOTE: 3/24/21:  Casey Fu has attended 10 physical therapy sessions for his intermittent left hamstrings and gluteal pain. Overall his symptoms have decreased, however continues to have good and bad days. Over the past 10 sessions, he has improved his hamstrings and quadriceps lengthening/flexibility and improving his core and LE strength. He presents with significant ROM restrictions in his right knee extension. With gait, he is unable to obtain terminal knee extension of his right side and creates increased pelvic and hip rotation of his left side, which increases stress to the left hip rotators. INITIAL ASSESSMENT:  Mr. Vivian Luu is a 64 y.o. male who presents to physical therapy with left gluteal pain that started with insidious onset 5 months ago. Notes symptoms are a deep ache into his left gluteal and intermittent tightness in left hamstrings. No radicular symptoms noted into his LLE. Symptoms are aggravated with stairs, climbing ladders and repetitive movements, especially during his golf swing. He presents with left innominate dysfunction, presents with posterior innominate dysfunction, sacral restrictions, soft tissue restrictions in hip rotators and hip flexors.  He would benefit from skilled physical therapy to improve overall mobility in lumbar spine and pelvis. PROBLEM LIST (Impacting functional limitations):  1. Decreased Strength  2. Decreased ADL/Functional Activities  3. Decreased Ambulation Ability/Technique  4. Decreased Balance  5. Increased Pain  6. Decreased Activity Tolerance  7. Decreased Flexibility/Joint Mobility INTERVENTIONS PLANNED: (Treatment may consist of any combination of the following)  1. Balance Exercise  2. Gait Training  3. Home Exercise Program (HEP)  4. Manual Therapy  5. Neuromuscular Re-education/Strengthening  6. Range of Motion (ROM)  7. Therapeutic Activites  8. Therapeutic Exercise/Strengthening     GOALS: (Goals have been discussed and agreed upon with patient.)  Discharge Goals: Time Frame: 90 days  1. Patient demonstrates independence with his HEP without verbal cueing. GOAL MET, progress as needed  2. Patient able to climb stairs and/or ladder without onset of discomfort. ONGOING  3. Patient able to play 18 holes of golf without onset of left buttocks pain. ONGOING  4. Improve LEFS outcome measure score from 66/80 to 75/80 to perform ADLs. ONGOING    OUTCOME MEASURE:   Tool Used: Lower Extremity Functional Scale (LEFS)  Score:  Initial: 66/80 Most Recent: X/80 (Date: -- )   Interpretation of Score: 20 questions each scored on a 5 point scale with 0 representing \"extreme difficulty or unable to perform\" and 4 representing \"no difficulty\". The lower the score, the greater the functional disability. 80/80 represents no disability. Minimal detectable change is 9 points. MEDICAL NECESSITY:   · Patient is expected to demonstrate progress in strength, range of motion, balance, coordination and functional technique to increase independence with ADLs, ability to climb stairs/ladder and return to golf.  .  REASON FOR SERVICES/OTHER COMMENTS:  · Patient continues to require skilled intervention due to challenged with daily discomfort into his left gluteals . Total Duration:  PT Patient Time In/Time Out  Time In: 0830  Time Out: 0930    Rehabilitation Potential For Stated Goals: Excellent  Regarding Marbin Nuñez's therapy, I certify that the treatment plan above will be carried out by a therapist or under their direction. Thank you for this referral,  Karolina Velasquez, PT     Referring Physician Signature: Rico Winter MD No Signature is Required for this note. PAIN/SUBJECTIVE:   Initial: Pain Intensity 1: 4  Pain Location 1: Pelvis  Pain Orientation 1: Left  Post Session:  3/10   HISTORY:   History of Injury/Illness (Reason for Referral):  Patient notes discomfort started with an insidious onset of left gluteals and hamstrings about 5-6 months ago. He is challenged with climbing stairs, going up a ladder and playing golf/repetitive movements. Notes symptoms started while golfing. He also notes challenges with prolonged sitting and driving. Patient denies dizziness, drop attacks, numbness/tingling, bowel/bladder dysfunction and/or unexplained weight gain/loss. Current Medications:       Current Outpatient Medications:     glucosamine-chondroitin (ARTHX) 500-400 mg cap, Take 1 Cap by mouth daily. , Disp: , Rfl:     fluticasone propionate (Flonase Allergy Relief) 50 mcg/actuation nasal spray, 2 Sprays by Both Nostrils route daily. , Disp: , Rfl:     methylPREDNISolone (MEDROL DOSEPACK) 4 mg tablet, TAD, Disp: 1 Dose Pack, Rfl: 0    Omega-3 Fatty Acids 60- mg cpDR, Take  by mouth., Disp: , Rfl:     multivitamin (ONE A DAY) tablet, Take 1 Tab by mouth daily. , Disp: , Rfl:     loratadine (CLARITIN) 10 mg tablet, Take 10 mg by mouth., Disp: , Rfl:    Date Last Reviewed:  3/24/2021     UPDATED OBJECTIVE FINDINGS:     Observation/Orthostatic Postural Assessment:          Patient denies any LE pain, paresthesia or symptoms. Patient denies any increase of symptoms with cough, sneeze or valsalva.  Patient denies any saddle paresthesia or bowel/bladder deficits. Patient exhibits a neutral lumbar lordosis and neutral thoracic kyphosis. Lower extremity weight bearing is symmetrical. Shoulder symmetry exhibits mild bilateral protraction. Observation of gait indicates decreased pelvic mobility (patient is a hip walker versus an efficient trunk walker). Lower quadrant bony landmarks are left iliac crest elevated, level greater trochanters, lack right knee extension. Leg swing for innominate mobility indicates left restrictions in innominate extension, neutral , ER and IR. Vertical compression test (VCT) for alignment is 3. Elbow flexion test (EFT) for motor activation is 3. Soft tissue observation indicates restrictions in bilateral iliopsoas, rectus femoris, left piriformis, hip rotators. Improving 3/24/2021    Palpation: Myofascial assessment indicates restrictions in thoracolumbar fascia. Muscle length testing in modified Rei position exhibits restricted left greater than right. Hamstring flexibility tested supine with straight leg raise (SLR) is restricted bilaterally. Piriformis length is restricted left greater than right. improving 3/24/2021 Quadriceps flexibility tested prone with heel to buttock is restricted left greater than right. Ely test is positive for rectus femoris tightness. Improving 3/24/2021 Gastrocnemius and soleus flexibility are WFL. Sacrotuberous ligament is not tested. Sacrococcygeal junction exhibits not tested. Body of coccyx is not tested. ROM: Passive trunk rotation is 80% available to the R and 80% available to the L. Kinetic testing in standing including forward bend test is positive left. Marcher's test is positive left. Pelvic shear test is standing exhibits decreased excursion of left shear with a hard end feel. Quadrant test exhibits excessive movement in L4-5 with extension.  Improving 3/24/2021 Single plane active movements through lumbar spine in standing exhibit restrictions noted in end range of all directions. Prone knee active flexion test is positive left. Spring testing of lumbar spine exhibits decreased a/p glide to L5-S1. Spring testing of sacrum exhibits decreased p/a to left sacral base. Spring testing of innominates exhibits restrictions to left innominate, position in posterior innominate dysfunction. FRS right L4-5 with limitations in extension, rotation and side bending left at L4-5  Improving 3/24/2021    AROM (PROM) Right Left   Hip flexion/Innominate flexion 90 90   Hip extension/Innominate extension -5 -5   Hip external rotation (ER) 20 20   Hip internal rotation (IR) 10 10   Knee extension Lacks 8  0     Strength: Lumbar protective mechanism (LPM) are sluggish for initiation. LPM Strength */5   R anterior to posterior diagonal 3   L anterior to posterior diagonal 3   R posterior to anterior diagonal 3   L posterior to anterior diagonal 3     Manual Muscle Test (*/5) Right Left   Knee extension 5 5   Knee flexion 5 5   Hip flexion 5 5   Hip ER 4 4   Hip IR 4 4   Hip extension 4 4   Hip abduction 4 4   Hip adduction 5 5   Ankle DF 5 5   Ankle PF 5 5   Core stability 4/5         Functional Mobility:  Challenged with prolonged standing and sitting positions, with golf swing, presents with increased mobility to L4-5, limited with left rotation/follow through. Presents with limited right knee terminal knee extension secondary to lack of knee extension. Body Structures Involved:  1. Bones  2. Joints  3. Muscles Body Functions Affected:  1. Sensory/Pain  2. Neuromusculoskeletal  3. Movement Related Activities and Participation Affected:  1. General Tasks and Demands  2. Mobility  3.  Community, Social and Franklin Welch

## 2021-03-25 NOTE — PROGRESS NOTES
Alem Banegas  : 1965  Primary: Yen Maya at [place] [address], [address] [facility]  [phone]   [phone]      OUTPATIENT PHYSICAL THERAPY: Daily Treatment Note 3/24/2021  Visit Count:  10    ICD-10: Treatment Diagnosis: low back pain M54.5  ICD-10: Treatment Diagnosis: Gluteal pain M79.18  Precautions/Allergies:   Codeine   TREATMENT PLAN:  Effective Dates: 2021 TO 2021 (90 days). Frequency/Duration: 2 times a week for 90 Day(s) MEDICAL/REFERRING DIAGNOSIS:  Gluteal pain [M79.18]   DATE OF ONSET: 5 months  REFERRING PHYSICIAN: Zachary Braswell MD MD Orders: evaluate and treat  Return MD Appointment       Pre-treatment Symptoms/Complaints: Patient notes good and bad days, overall less tension, however continues to feel pain on his left sit bone. Notes stretching at times can still irritate the area. Pain: Initial: Pain Intensity 1: 4  Pain Location 1: Pelvis  Pain Orientation 1: Left /10 Post Session:  3/10   Medications Last Reviewed:  3/24/2021  Updated Objective Findings:  challenged with pelvic position in sitting, increased lumbar spine extension noted. restrictions noted in left gluteals and hip rotators  TREATMENT:     THERAPEUTIC EXERCISE: (25 minutes):  Exercises per grid below to improve mobility, strength, balance and coordination. Required minimal verbal, manual and tactile cues to promote proper body alignment, promote proper body posture, promote proper body mechanics and promote proper body breathing techniques. Progressed resistance, range, repetitions and complexity of movement as indicated.      Date:  3/24/2021   Activity/Exercise Parameters   Supine piriformis stretch X 5 reps, 20 sec holds   Supine bilateral hamstrings and neural glides X 10 reps, 10 sec reps of ankle DF/PF   Supine bilateral 90/90 holds X 3 reps, work through phasic shakes   Seated posture training    Side lying hip abduction X 10 reps, 10 sec holds BLE, review technique on wall with posterior depression of pelvis   Hip Flexor stretch: supine and kneeling X 5 minute review of both positions and techniques   X 5 reps, 20 sec holds    High knees on wall X  10 reps, 5 sec holds   Wall calf stretch X 5 reps, 30 sec holds   Elle pose X 5 reps, 20 sec holds   High knees, soldier kicks and rollerblade: for trunk movement X 5 minute review of techniques     MANUAL THERAPY: (30 minutes): Joint mobilization and Soft tissue mobilization was utilized and necessary because of the patient's restricted joint motion, painful spasm, loss of articular motion and restricted motion of soft tissue.   (Used abbreviations: MET - muscle energy technique; PNF - proprioceptive neuromuscular facilitation; NMR - neuromuscular re-education; a/p - anterior to posterior; p/a - posterior to anterior, FMP - functional movement patterns, SF - Superficial Fascia; BC - Bony contours; MP - muscle play; IASTM - instrument-assisted soft tissue mobilization)  · Supine bilateral iliopsoas release with FMP of lower trunk rotation  · Prone bilateral hamstrings soft tissue mobilization with FMP of knee extension.   · Prone SF and BC along iliac crest and sacral sulcus with FMP of lower trunk rotation  · Prone hip on axis mobilization of left LE, followed with NMR into hip ER/IR  · Side lying right for left posterior depression of pelvis prolonged holds and combination of isotonics.   · Prone soft tissue mobilization to left hip rotators and hamstrings strumming with FMP of knee flexion/extension  · Supine right knee p/a mobilization  · Prone right hamstrings and calf soft tissue mobilization with FMP of knee flexion/extension.      [facility] Portal  Treatment/Session Summary:    · Response to Treatment: less soft tissue restrictions in right posterior thigh/leg with improved knee extension, continues to present with joint restrictions through his right  knee  · Communication/Consultation:  None today  · Equipment provided today:  None today  · Recommendations/Intent for next treatment session: Next visit will focus on core engagement, pelvic and hip joint mobilizations and NMR.  .    Total Treatment Billable Duration:  55 minutes  PT Patient Time In/Time Out  Time In: 0830  Time Out: 100 Josue Man PT    Future Appointments   Date Time Provider Brady Parish   3/29/2021  4:30 PM Christinechristina Garcia., PT [facility]   3/31/2021  8:30 AM Frida NICHOLE, PT [facility]   4/12/2021  4:30 PM Frida PORTILLO., PT [facility]   4/14/2021  8:30 AM Frida NICHOLE, PT [facility]   4/19/2021  4:30 PM Frida PORTILLO., PT [facility]   4/21/2021  8:30 AM Christine Garcia., PT [facility]   4/26/2021  4:30 PM Frida NICHOLE, PT [facility]   9/3/2021  7:30 AM [facility] LAB RESOURCE [facility]   9/8/2021  8:00 AM Joey Villanueva MD [facility]

## 2021-03-29 ENCOUNTER — HOSPITAL ENCOUNTER (OUTPATIENT)
Dept: PHYSICAL THERAPY | Age: 56
Discharge: HOME | End: 2021-03-29
Attending: FAMILY MEDICINE
Payer: COMMERCIAL

## 2021-03-29 PROCEDURE — 97110 THERAPEUTIC EXERCISES: CPT

## 2021-03-29 PROCEDURE — 97140 MANUAL THERAPY 1/> REGIONS: CPT

## 2021-03-30 NOTE — PROGRESS NOTES
Vani Cabrera  : 1965  Primary: Yen Maya at [place] [address], [address] [facility]  [phone]   [phone]      OUTPATIENT PHYSICAL THERAPY: Daily Treatment Note 3/29/2021  Visit Count:  11    ICD-10: Treatment Diagnosis: low back pain M54.5  ICD-10: Treatment Diagnosis: Gluteal pain M79.18  Precautions/Allergies:   Codeine   TREATMENT PLAN:  Effective Dates: 2021 TO 2021 (90 days). Frequency/Duration: 2 times a week for 90 Day(s) MEDICAL/REFERRING DIAGNOSIS:  Gluteal pain [M79.18]   DATE OF ONSET: 5 months  REFERRING PHYSICIAN: Hildred Brittle, MD MD Orders: evaluate and treat  Return MD Appointment       Pre-treatment Symptoms/Complaints: Patient notes improved mobility noted in LE however continues to have soreness at same intensity to his left ischial tuberosity. Pain: Initial: Pain Intensity 1: 3  Pain Location 1: Pelvis  Pain Orientation 1: Left /10 Post Session:  3/10   Medications Last Reviewed:  3/29/2021  Updated Objective Findings:  challenged with pelvic position in sitting, increased lumbar spine extension noted. restrictions noted in left gluteals and hip rotators  TREATMENT:     THERAPEUTIC EXERCISE: (15 minutes):  Exercises per grid below to improve mobility, strength, balance and coordination. Required minimal verbal, manual and tactile cues to promote proper body alignment, promote proper body posture, promote proper body mechanics and promote proper body breathing techniques. Progressed resistance, range, repetitions and complexity of movement as indicated.      Date:  3/29/2021   Activity/Exercise Parameters   Supine piriformis stretch X 5 reps, 20 sec holds   Supine bilateral hamstrings and neural glides X 10 reps, 10 sec reps of ankle DF/PF   Supine bilateral 90/90 holds X 3 reps, work through phasic shakes   Seated posture training    Side lying hip abduction X 10 reps, 10 sec holds BLE, review technique on wall with posterior depression of pelvis   Hip Flexor stretch: supine and kneeling X 5 minute review of both positions and techniques   X 5 reps, 20 sec holds    High knees on wall X  10 reps, 5 sec holds   Wall calf stretch X 5 reps, 30 sec holds   Elle pose X 5 reps, 20 sec holds   High knees, soldier kicks and rollerblade: for trunk movement X 5 minute review of techniques     MANUAL THERAPY: (40 minutes): Joint mobilization and Soft tissue mobilization was utilized and necessary because of the patient's restricted joint motion, painful spasm, loss of articular motion and restricted motion of soft tissue. (Used abbreviations: MET - muscle energy technique; PNF - proprioceptive neuromuscular facilitation; NMR - neuromuscular re-education; a/p - anterior to posterior; p/a - posterior to anterior, FMP - functional movement patterns, SF - Superficial Fascia; BC - Bony contours; MP - muscle play; IASTM - instrument-assisted soft tissue mobilization)  · Supine bilateral iliopsoas release with FMP of lower trunk rotation  · Prone bilateral hamstrings soft tissue mobilization with FMP of knee extension. · Prone SF and BC along iliac crest and sacral sulcus with FMP of lower trunk rotation  · Prone hip on axis mobilization of left LE, followed with NMR into hip ER/IR  · Side lying right for left posterior depression of pelvis prolonged holds and combination of isotonics. · Prone soft tissue mobilization to left hip rotators and hamstrings strumming with FMP of knee flexion/extension  · Prone tool assisted soft tissue techniques to bilateral gluteus medius/minimus  · Supine right knee p/a mobilization  · Prone right hamstrings and calf soft tissue mobilization with FMP of knee flexion/extension.       Med[facility] Portal  Treatment/Session Summary:    · Response to Treatment: continues to be limited with right knee extension, decreased restrictions noted in bilateral hamstrings. · Communication/Consultation:  None today  · Equipment provided today:  None today  · Recommendations/Intent for next treatment session: Next visit will focus on core engagement, pelvic and hip joint mobilizations and NMR.  .    Total Treatment Billable Duration:  55 minutes  PT Patient Time In/Time Out  Time In: 1630  Time Out: ERICH Hernandez    Future Appointments   Date Time Provider Brady Parish   3/31/2021  8:30 AM Sofía Deem A., PT [facility]   4/12/2021  4:30 PM Sofía Deem A., PT [facility]   4/14/2021  8:30 AM Sofía Deem A., PT [facility]   4/19/2021  4:30 PM Sofía Deem A., PT [facility]   4/21/2021  8:30 AM Ming Ac., PT [facility]   4/26/2021  4:30 PM Sofía Deem A., PT [facility]   9/3/2021  7:30 AM [facility] LAB RESOURCE [facility]   9/8/2021  8:00 AM Jaime Hawk MD [facility]

## 2021-03-31 ENCOUNTER — HOSPITAL ENCOUNTER (OUTPATIENT)
Dept: PHYSICAL THERAPY | Age: 56
Discharge: HOME | End: 2021-03-31
Attending: FAMILY MEDICINE
Payer: COMMERCIAL

## 2021-03-31 PROCEDURE — 97110 THERAPEUTIC EXERCISES: CPT

## 2021-03-31 PROCEDURE — 97140 MANUAL THERAPY 1/> REGIONS: CPT

## 2021-04-01 NOTE — PROGRESS NOTES
Trung López  : 1965  Primary: Yen Maya at [place] [address], [address] [phone]   [phone]      OUTPATIENT PHYSICAL THERAPY: Daily Treatment Note 3/31/2021  Visit Count:  12    ICD-10: Treatment Diagnosis: low back pain M54.5  ICD-10: Treatment Diagnosis: Gluteal pain M79.18  Precautions/Allergies:   Codeine   TREATMENT PLAN:  Effective Dates: 2021 TO 2021 (90 days). Frequency/Duration: 2 times a week for 90 Day(s) MEDICAL/REFERRING DIAGNOSIS:  Gluteal pain [M79.18]   DATE OF ONSET: 5 months  REFERRING PHYSICIAN: Carlos Alberto Elena MD MD Orders: evaluate and treat  Return MD Appointment       Pre-treatment Symptoms/Complaints: Patient notes continued discomfort on left ischial tuberosity. Patient will be out of town next week. Pain: Initial: Pain Intensity 1: 4  Pain Location 1: Pelvis  Pain Orientation 1: Left /10 Post Session:  3/10   Medications Last Reviewed:  3/31/2021  Updated Objective Findings:  challenged with pelvic position in sitting, increased lumbar spine extension noted. restrictions noted in left gluteals and hip rotators  TREATMENT:     THERAPEUTIC EXERCISE: (15 minutes):  Exercises per grid below to improve mobility, strength, balance and coordination. Required minimal verbal, manual and tactile cues to promote proper body alignment, promote proper body posture, promote proper body mechanics and promote proper body breathing techniques. Progressed resistance, range, repetitions and complexity of movement as indicated.      Date:  3/31/2021   Activity/Exercise Parameters   Supine piriformis stretch X 5 reps, 20 sec holds   Supine bilateral hamstrings and neural glides X 10 reps, 10 sec reps of ankle DF/PF   Supine bilateral 90/90 holds X 3 reps, work through phasic shakes   Seated posture training    Side lying hip abduction X 10 reps, 10 sec holds BLE, review technique on wall with posterior depression of pelvis   Hip Flexor stretch: supine and kneeling    High knees on wall X  10 reps, 5 sec holds   Wall calf stretch X 5 reps, 30 sec holds   Elle pose X 5 reps, 20 sec holds   High knees, soldier kicks and rollerblade: for trunk movement    Standing rectus femoris stretch X 5 reps, 20 sec holds  X 5 reps prone position, 20 sec holds     MANUAL THERAPY: (40 minutes): Joint mobilization and Soft tissue mobilization was utilized and necessary because of the patient's restricted joint motion, painful spasm, loss of articular motion and restricted motion of soft tissue. (Used abbreviations: MET - muscle energy technique; PNF - proprioceptive neuromuscular facilitation; NMR - neuromuscular re-education; a/p - anterior to posterior; p/a - posterior to anterior, FMP - functional movement patterns, SF - Superficial Fascia; BC - Bony contours; MP - muscle play; IASTM - instrument-assisted soft tissue mobilization)  · Supine bilateral iliopsoas release with FMP of lower trunk rotation  · Prone bilateral hamstrings soft tissue mobilization with FMP of knee extension. · Prone SF and BC along iliac crest and sacral sulcus with FMP of lower trunk rotation  · Prone hip on axis mobilization of left LE, followed with NMR into hip ER/IR  · Prone soft tissue mobilization to left hip rotators and hamstrings strumming with FMP of knee flexion/extension  · Prone tool assisted soft tissue techniques to left quadratus femoris  · Supine right knee p/a mobilization  · Prone right hamstrings and calf soft tissue mobilization with FMP of knee flexion/extension. [facility] Portal  Treatment/Session Summary:    · Response to Treatment: improvement noted in right knee extension today and improving flexibility through bilateral hip flexors.    · Communication/Consultation:  None today  · Equipment provided today:  None today  · Recommendations/Intent for next treatment session: Next visit will focus on core engagement, pelvic and hip joint mobilizations and NMR.  .    Total Treatment Billable Duration:  55 minutes  PT Patient Time In/Time Out  Time In: 0830  Time Out: 100 Josue Man PT    Future Appointments   Date Time Provider [facility] Parish   4/12/2021  4:30 PM Lizeth Daley, PT [facility]   4/14/2021  8:30 AM Licha NICHOLE, PT [facility]   4/19/2021  4:30 PM Licha NICHOLE, PT [facility]   4/21/2021  8:30 AM Lizeth Daley, PT [facility]   4/26/2021  4:30 PM Licha NICHOLE, PT [facility]   9/3/2021  7:30 AM [facility] LAB RESOURCE [facility]   9/8/2021  8:00 AM Kindra Robbins MD [facility]

## 2021-04-12 ENCOUNTER — HOSPITAL ENCOUNTER (OUTPATIENT)
Dept: PHYSICAL THERAPY | Age: 56
Discharge: HOME | End: 2021-04-12
Attending: FAMILY MEDICINE
Payer: COMMERCIAL

## 2021-04-12 PROCEDURE — 97110 THERAPEUTIC EXERCISES: CPT

## 2021-04-12 PROCEDURE — 97140 MANUAL THERAPY 1/> REGIONS: CPT

## 2021-04-13 NOTE — PROGRESS NOTES
Vani Cabrera  : 1965  Primary: Yen 82 at [place] [address], [address] [facility]  [phone]   [phone]      OUTPATIENT PHYSICAL THERAPY: Daily Treatment Note 2021  Visit Count:  13    ICD-10: Treatment Diagnosis: low back pain M54.5  ICD-10: Treatment Diagnosis: Gluteal pain M79.18  Precautions/Allergies:   Codeine   TREATMENT PLAN:  Effective Dates: 2021 TO 2021 (90 days). Frequency/Duration: 2 times a week for 90 Day(s) MEDICAL/REFERRING DIAGNOSIS:  Gluteal pain [M79.18]   DATE OF ONSET: 5 months  REFERRING PHYSICIAN: Hildred Brittle, MD MD Orders: evaluate and treat  Return MD Appointment       Pre-treatment Symptoms/Complaints: Patient notes traveled this past week and increased pain noted with driving. Irritation in left buttocks decreased some however tension and tightness in his low back has increased over the past week. Pain: Initial: Pain Intensity 1: 4  Pain Location 1: Pelvis  Pain Orientation 1: Left /10 Post Session:  3/10   Medications Last Reviewed:  2021  Updated Objective Findings:  challenged with pelvic position in sitting, increased lumbar spine extension noted. restrictions noted in left gluteals and hip rotators  TREATMENT:     THERAPEUTIC EXERCISE: (15 minutes):  Exercises per grid below to improve mobility, strength, balance and coordination. Required minimal verbal, manual and tactile cues to promote proper body alignment, promote proper body posture, promote proper body mechanics and promote proper body breathing techniques. Progressed resistance, range, repetitions and complexity of movement as indicated.      Date:  2021   Activity/Exercise Parameters   Supine piriformis stretch X 5 reps, 20 sec holds   Supine bilateral hamstrings and neural glides X 10 reps, 10 sec reps of ankle DF/PF   Supine bilateral 90/90 holds X 3 reps, work through phasic shakes Seated posture training X 5 minute review of desk chairs pros and cons   Side lying hip abduction HEP   Hip Flexor stretch: supine and kneeling    High knees on wall X  10 reps, 5 sec holds   Wall calf stretch X 5 reps, 30 sec holds   Elle pose X 5 reps, 20 sec holds   High knees, soldier kicks and rollerblade: for trunk movement    Standing rectus femoris stretch          MANUAL THERAPY: (40 minutes): Joint mobilization and Soft tissue mobilization was utilized and necessary because of the patient's restricted joint motion, painful spasm, loss of articular motion and restricted motion of soft tissue. (Used abbreviations: MET - muscle energy technique; PNF - proprioceptive neuromuscular facilitation; NMR - neuromuscular re-education; a/p - anterior to posterior; p/a - posterior to anterior, FMP - functional movement patterns, SF - Superficial Fascia; BC - Bony contours; MP - muscle play; IASTM - instrument-assisted soft tissue mobilization)  · Supine bilateral iliopsoas release with FMP of lower trunk rotation  · Prone bilateral hamstrings soft tissue mobilization with FMP of knee extension. · Prone SF and BC along iliac crest and sacral sulcus with FMP of lower trunk rotation, lumbar spine paraspinals strumming  · Prone hip on axis mobilization of left LE, followed with NMR into hip ER/IR  · Prone soft tissue mobilization to left hip rotators and hamstrings strumming with FMP of knee flexion/extension  · Prone tool assisted soft tissue techniques to left quadratus femoris  · Supine right knee p/a mobilization  · Prone right hamstrings and calf soft tissue mobilization with FMP of knee flexion/extension. [facility] Portal  Treatment/Session Summary:    · Response to Treatment: decreased restrictions noted in lumbar spine and pelvis. Improved awareness of desk chairs.    · Communication/Consultation:  None today  · Equipment provided today:  None today  · Recommendations/Intent for next treatment session: Next visit will focus on core engagement, pelvic and hip joint mobilizations and NMR.  .    Total Treatment Billable Duration:  55 minutes  PT Patient Time In/Time Out  Time In: 1630  Time Out: ERICH Hernandez    Future Appointments   Date Time Provider Brady Parish   4/14/2021  8:30 AM Yancey Flank., PT [facility]   4/19/2021  4:30 PM Simone Dino A., PT [facility]   4/21/2021  8:30 AM Yancey Flank., PT [facility]   4/26/2021  4:30 PM Simone Dino A., PT [facility]   9/3/2021  7:30 AM [facility] LAB RESOURCE [facility]   9/8/2021  8:00 AM Kendra Mares MD [facility]

## 2021-04-14 ENCOUNTER — HOSPITAL ENCOUNTER (OUTPATIENT)
Dept: PHYSICAL THERAPY | Age: 56
Discharge: HOME | End: 2021-04-14
Attending: FAMILY MEDICINE
Payer: COMMERCIAL

## 2021-04-14 PROCEDURE — 97140 MANUAL THERAPY 1/> REGIONS: CPT

## 2021-04-14 PROCEDURE — 97110 THERAPEUTIC EXERCISES: CPT

## 2021-04-15 NOTE — PROGRESS NOTES
Eddi Giang  : 1965  Primary: Yen Maya at [place] [address], [address] [facility]  [phone]   [phone]      OUTPATIENT PHYSICAL THERAPY: Daily Treatment Note 2021  Visit Count:  14    ICD-10: Treatment Diagnosis: low back pain M54.5  ICD-10: Treatment Diagnosis: Gluteal pain M79.18  Precautions/Allergies:   Codeine   TREATMENT PLAN:  Effective Dates: 2021 TO 2021 (90 days). Frequency/Duration: 2 times a week for 90 Day(s) MEDICAL/REFERRING DIAGNOSIS:  Gluteal pain [M79.18]   DATE OF ONSET: 5 months  REFERRING PHYSICIAN: George Villalta MD MD Orders: evaluate and treat  Return MD Appointment       Pre-treatment Symptoms/Complaints: Patient notes went to his yoga class Monday night and noted improvements in mobility in his lumbar spine. Less intensity noted in his left gluteal region. Pain: Initial: Pain Intensity 1: 4  Pain Location 1: Pelvis, Spine, lumbar  Pain Orientation 1: Left /10 Post Session:  3/10   Medications Last Reviewed:  2021  Updated Objective Findings:  challenged with pelvic position in sitting, increased lumbar spine extension noted. restrictions noted in left gluteals and hip rotators  TREATMENT:     THERAPEUTIC EXERCISE: (25 minutes):  Exercises per grid below to improve mobility, strength, balance and coordination. Required minimal verbal, manual and tactile cues to promote proper body alignment, promote proper body posture, promote proper body mechanics and promote proper body breathing techniques. Progressed resistance, range, repetitions and complexity of movement as indicated.      Date:  2021   Activity/Exercise Parameters   Supine piriformis stretch X 5 reps, 20 sec holds   Supine bilateral hamstrings and neural glides X 10 reps, 10 sec reps of ankle DF/PF   Supine bilateral 90/90 holds X 3 reps, work through phasic shakes   Seated posture training X 5 minute review of desk chairs pros and cons   Side lying hip abduction HEP   Hip Flexor stretch: supine and kneeling    High knees on wall X  10 reps, 5 sec holds   Wall calf stretch X 5 reps, 30 sec holds   Elle pose X 5 reps, 20 sec holds   High knees, soldier kicks and rollerblade: for trunk movement    Standing rectus femoris stretch    Supine dead bug core X 10 reps, slow and controlled     MANUAL THERAPY: (30 minutes): Joint mobilization and Soft tissue mobilization was utilized and necessary because of the patient's restricted joint motion, painful spasm, loss of articular motion and restricted motion of soft tissue. (Used abbreviations: MET - muscle energy technique; PNF - proprioceptive neuromuscular facilitation; NMR - neuromuscular re-education; a/p - anterior to posterior; p/a - posterior to anterior, FMP - functional movement patterns, SF - Superficial Fascia; BC - Bony contours; MP - muscle play; IASTM - instrument-assisted soft tissue mobilization)  · Supine bilateral iliopsoas release with FMP of lower trunk rotation  · Prone bilateral hamstrings soft tissue mobilization with FMP of knee extension. · Prone SF and BC along iliac crest and sacral sulcus with FMP of lower trunk rotation, lumbar spine paraspinals strumming  · Prone hip on axis mobilization of left LE, followed with NMR into hip ER/IR  · Prone soft tissue mobilization to left hip rotators and hamstrings strumming with FMP of knee flexion/extension  · Prone tool assisted soft tissue techniques to left quadratus femoris  · Supine right knee p/a mobilization  · Prone right hamstrings and calf soft tissue mobilization with FMP of knee flexion/extension. [employer] Portal  Treatment/Session Summary:    · Response to Treatment: continues to present with phasic shakes with core engagement exercises. Overall improving soft tissue mobility, would benefit from increased core strength.    · Communication/Consultation:  None today  · Equipment provided today:  None today  · Recommendations/Intent for next treatment session: Next visit will focus on core engagement, pelvic and hip joint mobilizations and NMR.  .    Total Treatment Billable Duration:  55 minutes  PT Patient Time In/Time Out  Time In: 0830  Time Out: 100 Josue Man PT    Future Appointments   Date Time Provider Brady Parish   4/19/2021  4:30 PM Tata Monik., PT [facility]   4/21/2021  8:30 AM Tata Monik., PT [facility]   4/26/2021  4:30 PM Kelford Senegal A., PT [facility]   9/3/2021  7:30 AM [facility] LAB RESOURCE [facility]F [facility]F   9/8/2021  8:00 AM Carlos Alberto Elena MD [facility] HTF HTF

## 2021-04-19 ENCOUNTER — HOSPITAL ENCOUNTER (OUTPATIENT)
Dept: PHYSICAL THERAPY | Age: 56
Discharge: HOME | End: 2021-04-19
Attending: FAMILY MEDICINE
Payer: COMMERCIAL

## 2021-04-19 PROCEDURE — 97140 MANUAL THERAPY 1/> REGIONS: CPT

## 2021-04-19 PROCEDURE — 97110 THERAPEUTIC EXERCISES: CPT

## 2021-04-20 NOTE — PROGRESS NOTES
Alonzo Marks  : 1965  Primary: Yen 82 at [place] [address], [address] [facility]  [phone]   [phone]      OUTPATIENT PHYSICAL THERAPY: Daily Treatment Note 2021  Visit Count:  15    ICD-10: Treatment Diagnosis: low back pain M54.5  ICD-10: Treatment Diagnosis: Gluteal pain M79.18  Precautions/Allergies:   Codeine   TREATMENT PLAN:  Effective Dates: 2021 TO 2021 (90 days). Frequency/Duration: 2 times a week for 90 Day(s) MEDICAL/REFERRING DIAGNOSIS:  Gluteal pain [M79.18]   DATE OF ONSET: 5 months  REFERRING PHYSICIAN: Nati Peña MD MD Orders: evaluate and treat  Return MD Appointment       Pre-treatment Symptoms/Complaints: Patient notes less stiffness in his low back. Less tension in left buttocks. Exercises and yoga class are going well. Challenged with prolonged sitting at desk for work. Pain: Initial: Pain Intensity 1: 3  Pain Location 1: Pelvis, Spine, lumbar  Pain Orientation 1: Left /10 Post Session:  3/10   Medications Last Reviewed:  2021  Updated Objective Findings:  challenged with pelvic position in sitting, increased lumbar spine extension noted. restrictions noted in left gluteals and hip rotators  TREATMENT:     THERAPEUTIC EXERCISE: (15 minutes):  Exercises per grid below to improve mobility, strength, balance and coordination. Required minimal verbal, manual and tactile cues to promote proper body alignment, promote proper body posture, promote proper body mechanics and promote proper body breathing techniques. Progressed resistance, range, repetitions and complexity of movement as indicated.      Date:  2021   Activity/Exercise Parameters   Supine piriformis stretch X 5 reps, 20 sec holds   Supine bilateral hamstrings and neural glides X 10 reps, 10 sec reps of ankle DF/PF   Supine bilateral 90/90 holds X 3 reps, work through phasic shakes   Seated posture training X 5 minute review of desk chairs pros and cons   Side lying hip abduction HEP   Hip Flexor stretch: supine and kneeling    High knees on wall    Wall calf stretch    Elle pose X 5 reps, 20 sec holds   High knees, soldier kicks and rollerblade: for trunk movement    Standing rectus femoris stretch    Supine dead bug core X 10 reps, slow and controlled     MANUAL THERAPY: (40 minutes): Joint mobilization and Soft tissue mobilization was utilized and necessary because of the patient's restricted joint motion, painful spasm, loss of articular motion and restricted motion of soft tissue. (Used abbreviations: MET - muscle energy technique; PNF - proprioceptive neuromuscular facilitation; NMR - neuromuscular re-education; a/p - anterior to posterior; p/a - posterior to anterior, FMP - functional movement patterns, SF - Superficial Fascia; BC - Bony contours; MP - muscle play; IASTM - instrument-assisted soft tissue mobilization)  · Supine bilateral iliopsoas release with FMP of lower trunk rotation  · Prone bilateral hamstrings soft tissue mobilization with FMP of knee extension. · Prone SF and BC along iliac crest and sacral sulcus with FMP of lower trunk rotation, lumbar spine paraspinals strumming  · Prone hip on axis mobilization of left LE, followed with NMR into hip ER/IR  · Prone soft tissue mobilization to left hip rotators and hamstrings strumming with FMP of knee flexion/extension  · Prone tool assisted soft tissue techniques to left quadratus femoris  · Supine right knee p/a mobilization  · Prone right hamstrings and calf soft tissue mobilization with FMP of knee flexion/extension. [facility] Portal  Treatment/Session Summary:    · Response to Treatment: restrictions noted in bilateral hamstrings today, improving activation of core and improving bilateral iliopsoas mobility.    · Communication/Consultation:  None today  · Equipment provided today:  None today  · Recommendations/Intent for next treatment session: Next visit will focus on core engagement, pelvic and hip joint mobilizations and NMR.  .    Total Treatment Billable Duration:  55 minutes  PT Patient Time In/Time Out  Time In: 1630  Time Out: ERICH Hernandez    Future Appointments   Date Time Provider Brady Parish   4/21/2021  8:30 AM Mehul Blanton, PT [facility]   4/26/2021  4:30 PM Sammi NICHOLE PT [facility]   9/3/2021  7:30 AM [facility]F LAB RESOURCE [facility] HTF HTF   9/8/2021  8:00 AM [facility]

## 2021-04-21 ENCOUNTER — HOSPITAL ENCOUNTER (OUTPATIENT)
Dept: PHYSICAL THERAPY | Age: 56
Discharge: HOME | End: 2021-04-21
Attending: FAMILY MEDICINE
Payer: COMMERCIAL

## 2021-04-21 PROCEDURE — 97110 THERAPEUTIC EXERCISES: CPT

## 2021-04-21 PROCEDURE — 97140 MANUAL THERAPY 1/> REGIONS: CPT

## 2021-04-22 NOTE — PROGRESS NOTES
Lynne Chu  : 1965  Primary: Yen Maya at [place] [address], [address] [facility]  [phone]   [phone]      OUTPATIENT PHYSICAL THERAPY: Daily Treatment Note 2021  Visit Count:  16    ICD-10: Treatment Diagnosis: low back pain M54.5  ICD-10: Treatment Diagnosis: Gluteal pain M79.18  Precautions/Allergies:   Codeine   TREATMENT PLAN:  Effective Dates: 2021 TO 2021 (90 days). Frequency/Duration: 2 times a week for 90 Day(s) MEDICAL/REFERRING DIAGNOSIS:  Gluteal pain [M79.18]   DATE OF ONSET: 5 months  REFERRING PHYSICIAN: Kapil Young MD MD Orders: evaluate and treat  Return MD Appointment       Pre-treatment Symptoms/Complaints: Patient notes exercises going well at home, taking breaks for prolonged sitting. Notes he does better with standing at his desk. Pain: Initial: Pain Intensity 1: 3  Pain Location 1: Pelvis, Spine, lumbar  Pain Orientation 1: Left /10 Post Session:  3/10   Medications Last Reviewed:  2021  Updated Objective Findings:  challenged with pelvic position in sitting, increased lumbar spine extension noted. restrictions noted in left gluteals and hip rotators  TREATMENT:     THERAPEUTIC EXERCISE: (25 minutes):  Exercises per grid below to improve mobility, strength, balance and coordination. Required minimal verbal, manual and tactile cues to promote proper body alignment, promote proper body posture, promote proper body mechanics and promote proper body breathing techniques. Progressed resistance, range, repetitions and complexity of movement as indicated.      Date:  2021   Activity/Exercise Parameters   Supine piriformis stretch X 5 reps, 20 sec holds   Supine bilateral hamstrings and neural glides X 10 reps, 10 sec reps of ankle DF/PF   Supine bilateral 90/90 holds X 3 reps, work through phasic shakes   Seated posture training X 5 minute review of desk chairs and standing desks. Side lying hip abduction HEP   Hip Flexor stretch: supine and kneeling    High knees on wall    Wall calf stretch    Elle pose X 5 reps, 20 sec holds   High knees, soldier kicks and rollerblade: for trunk movement    Standing rectus femoris stretch    Supine dead bug core X 10 reps, slow and controlled     MANUAL THERAPY: (30 minutes): Joint mobilization and Soft tissue mobilization was utilized and necessary because of the patient's restricted joint motion, painful spasm, loss of articular motion and restricted motion of soft tissue. (Used abbreviations: MET - muscle energy technique; PNF - proprioceptive neuromuscular facilitation; NMR - neuromuscular re-education; a/p - anterior to posterior; p/a - posterior to anterior, FMP - functional movement patterns, SF - Superficial Fascia; BC - Bony contours; MP - muscle play; IASTM - instrument-assisted soft tissue mobilization)  · Supine bilateral iliopsoas release with FMP of lower trunk rotation  · Prone bilateral hamstrings soft tissue mobilization with FMP of knee extension. · Prone SF and BC along iliac crest and sacral sulcus with FMP of lower trunk rotation, lumbar spine paraspinals strumming  · Prone hip on axis mobilization of left LE, followed with NMR into hip ER/IR  · Prone soft tissue mobilization to left hip rotators and hamstrings strumming with FMP of knee flexion/extension  · Prone tool assisted soft tissue techniques to left quadratus femoris  · Supine right knee p/a mobilization  · Prone right hamstrings and calf soft tissue mobilization with FMP of knee flexion/extension. [employer] Portal  Treatment/Session Summary:    · Response to Treatment: improvements noted in LE flexibility, improving core engagement, continues to benefit from core endurance training.    · Communication/Consultation:  None today  · Equipment provided today:  None today  · Recommendations/Intent for next treatment session: Next visit will focus on core engagement, pelvic and hip joint mobilizations and NMR.  .    Total Treatment Billable Duration:  55 minutes  PT Patient Time In/Time Out  Time In: 0830  Time Out: 100 Josue Man PT    Future Appointments   Date Time Provider Brady Parish   4/26/2021  4:30 PM Gardenia Hernandez, PT [facility]   9/3/2021  7:30 AM HTF LAB RESOURCE [facility] HTF HTF   9/8/2021  8:00 AM Lulu Swift MD [facility] HTF HTF

## 2021-04-26 ENCOUNTER — HOSPITAL ENCOUNTER (OUTPATIENT)
Dept: PHYSICAL THERAPY | Age: 56
Discharge: HOME | End: 2021-04-26
Attending: FAMILY MEDICINE
Payer: COMMERCIAL

## 2021-04-26 PROCEDURE — 97140 MANUAL THERAPY 1/> REGIONS: CPT

## 2021-04-26 PROCEDURE — 97110 THERAPEUTIC EXERCISES: CPT

## 2021-04-27 NOTE — PROGRESS NOTES
Eloisa Bhardwaj  : 1965  Primary: Yen Maya at [facility], [address] [facility]  [phone]   [phone]      OUTPATIENT PHYSICAL THERAPY: Daily Treatment Note 2021  Visit Count:  17    ICD-10: Treatment Diagnosis: low back pain M54.5  ICD-10: Treatment Diagnosis: Gluteal pain M79.18  Precautions/Allergies:   Codeine   TREATMENT PLAN:  Effective Dates: 2021 TO 2021 (90 days). Frequency/Duration: 2 times a week for 90 Day(s) MEDICAL/REFERRING DIAGNOSIS:  Gluteal pain [M79.18]   DATE OF ONSET: 5 months  REFERRING PHYSICIAN: Kindra Robbins MD MD Orders: evaluate and treat  Return MD Appointment       Pre-treatment Symptoms/Complaints: Patient notes feels good with keeping up with his exercises independently. Continues to note most discomfort with sitting. Working on improving his desk set up to have standing and sitting options. Also noted when worked from the office would walk 10 minutes, 3-5x a day, working on incorporating that into his daily schedule as he works from home. Pain: Initial: Pain Intensity 1: 3  Pain Location 1: Buttocks  Pain Orientation 1: Left /10 Post Session:  3/10   Medications Last Reviewed:  2021  Updated Objective Findings:  challenged with pelvic position in sitting, increased lumbar spine extension noted. restrictions noted in left gluteals and hip rotators  TREATMENT:     THERAPEUTIC EXERCISE: (25 minutes):  Exercises per grid below to improve mobility, strength, balance and coordination. Required minimal verbal, manual and tactile cues to promote proper body alignment, promote proper body posture, promote proper body mechanics and promote proper body breathing techniques. Progressed resistance, range, repetitions and complexity of movement as indicated.      Date:  2021   Activity/Exercise Parameters   Supine piriformis stretch X 5 reps, 20 sec holds Supine bilateral hamstrings and neural glides X 10 reps, 10 sec reps of ankle DF/PF   Supine bilateral 90/90 holds X 3 reps, work through phasic shakes   Seated posture training X 5 minute review of standing desk set up. Side lying hip abduction HEP   Hip Flexor stretch: supine and kneeling    High knees on wall    Wall calf stretch    Elle pose X 5 reps, 20 sec holds   High knees, soldier kicks and rollerblade: for trunk movement    Standing rectus femoris stretch    Supine dead bug core X 10 reps, slow and controlled   Standing posture X 10 minute review of neutral standing posture, base of support, weight shift     MANUAL THERAPY: (30 minutes): Joint mobilization and Soft tissue mobilization was utilized and necessary because of the patient's restricted joint motion, painful spasm, loss of articular motion and restricted motion of soft tissue. (Used abbreviations: MET - muscle energy technique; PNF - proprioceptive neuromuscular facilitation; NMR - neuromuscular re-education; a/p - anterior to posterior; p/a - posterior to anterior, FMP - functional movement patterns, SF - Superficial Fascia; BC - Bony contours; MP - muscle play; IASTM - instrument-assisted soft tissue mobilization)  · Supine bilateral iliopsoas release with FMP of lower trunk rotation  · Prone bilateral hamstrings soft tissue mobilization with FMP of knee extension. · Prone SF and BC along iliac crest and sacral sulcus with FMP of lower trunk rotation, lumbar spine paraspinals strumming  · Prone hip on axis mobilization of left LE, followed with NMR into hip ER/IR  · Prone soft tissue mobilization to left hip rotators and hamstrings strumming with FMP of knee flexion/extension  · Prone tool assisted soft tissue techniques to left quadratus femoris  · Supine right knee p/a mobilization  · Prone right hamstrings and calf soft tissue mobilization with FMP of knee flexion/extension.       [facility] Portal  Treatment/Session Summary: · Response to Treatment: improved awareness of standing posture, improving LE flexibility and joint mobility. · Communication/Consultation:  None today  · Equipment provided today:  None today  · Recommendations/Intent for next treatment session: Patient will work independently for 1 month on HEP, desk set up and increasing walking routine. Will send progression exercises to him for core and LE strength.      Total Treatment Billable Duration:  55 minutes  PT Patient Time In/Time Out  Time In: 1630  Time Out: ERICH Hernandez    Future Appointments   Date Time Provider Brady Parish   9/3/2021  7:30 AM [facility] LAB RESOURCE [facility]   9/8/2021  8:00 AM Deshawn Ortega MD [facility]

## 2021-09-30 NOTE — THERAPY DISCHARGE
Cony Rangel  : 1965  Primary: Yen Maay at [facility], [address]  [phone]   [phone]          OUTPATIENT PHYSICAL THERAPY:Discontinuation Summary 2021     ICD-10: Treatment Diagnosis: low back pain M54.5  ICD-10: Treatment Diagnosis: Gluteal pain M79.18  Precautions/Allergies:   Codeine   TREATMENT PLAN:  Effective Dates: 2021 TO 2021 (90 days). Frequency/Duration: 2 times a week for 90 Day(s) MEDICAL/REFERRING DIAGNOSIS:  Myalgia, other site [M79.18]   DATE OF ONSET: 5 months  REFERRING PHYSICIAN: Bravo Worthington MD MD Orders: evaluate and treat  Return MD Appointment: as needed   DISCONTINUATION NOTE: 21: As of 2021, Cony Rangel has attended 17 out of 17 scheduled visits from 21 TO 21 , with 0 cancellation(s) and 0 no shows. He has met his physical therapy goals at this time and has worked independently on his HEP since last seen in therapy in 2021. His physical therapy case will be discontinued at this time. PROGRESS NOTE: 3/24/21:  Cony Rangel has attended 10 physical therapy sessions for his intermittent left hamstrings and gluteal pain. Overall his symptoms have decreased, however continues to have good and bad days. Over the past 10 sessions, he has improved his hamstrings and quadriceps lengthening/flexibility and improving his core and LE strength. He presents with significant ROM restrictions in his right knee extension. With gait, he is unable to obtain terminal knee extension of his right side and creates increased pelvic and hip rotation of his left side, which increases stress to the left hip rotators. INITIAL ASSESSMENT:  Mr. Betty Burger is a 64 y.o. male who presents to physical therapy with left gluteal pain that started with insidious onset 5 months ago.  Notes symptoms are a deep ache into his left gluteal and intermittent tightness in left hamstrings. No radicular symptoms noted into his LLE. Symptoms are aggravated with stairs, climbing ladders and repetitive movements, especially during his golf swing. He presents with left innominate dysfunction, presents with posterior innominate dysfunction, sacral restrictions, soft tissue restrictions in hip rotators and hip flexors. He would benefit from skilled physical therapy to improve overall mobility in lumbar spine and pelvis. GOALS: (Goals have been discussed and agreed upon with patient.)  Discharge Goals: Time Frame: 90 days  1. Patient demonstrates independence with his HEP without verbal cueing. GOAL MET  2. Patient able to climb stairs and/or ladder without onset of discomfort. GOAL MET  3. Patient able to play 18 holes of golf without onset of left buttocks pain. GOAL MET  4. Improve LEFS outcome measure score from 66/80 to 75/80 to perform ADLs. NOT ASSESSED    OUTCOME MEASURE:   Tool Used: Lower Extremity Functional Scale (LEFS)  Score:  Initial: 66/80 Most Recent: X/80 (Date: -- )   Interpretation of Score: 20 questions each scored on a 5 point scale with 0 representing \"extreme difficulty or unable to perform\" and 4 representing \"no difficulty\". The lower the score, the greater the functional disability. 80/80 represents no disability. Minimal detectable change is 9 points. Thank you for this referral,  Jasmin Elias PT     Referring Physician Signature: Elian Benoit MD No Signature is Required for this note.

## 2022-03-22 ENCOUNTER — APPOINTMENT (RX ONLY)
Dept: URBAN - METROPOLITAN AREA CLINIC 23 | Facility: CLINIC | Age: 57
Setting detail: DERMATOLOGY
End: 2022-03-22

## 2022-03-22 DIAGNOSIS — Z71.89 OTHER SPECIFIED COUNSELING: ICD-10-CM

## 2022-03-22 DIAGNOSIS — D18.0 HEMANGIOMA: ICD-10-CM

## 2022-03-22 DIAGNOSIS — D22 MELANOCYTIC NEVI: ICD-10-CM

## 2022-03-22 DIAGNOSIS — L82.1 OTHER SEBORRHEIC KERATOSIS: ICD-10-CM

## 2022-03-22 DIAGNOSIS — L57.8 OTHER SKIN CHANGES DUE TO CHRONIC EXPOSURE TO NONIONIZING RADIATION: ICD-10-CM

## 2022-03-22 DIAGNOSIS — D485 NEOPLASM OF UNCERTAIN BEHAVIOR OF SKIN: ICD-10-CM

## 2022-03-22 PROBLEM — D48.5 NEOPLASM OF UNCERTAIN BEHAVIOR OF SKIN: Status: ACTIVE | Noted: 2022-03-22

## 2022-03-22 PROBLEM — D18.01 HEMANGIOMA OF SKIN AND SUBCUTANEOUS TISSUE: Status: ACTIVE | Noted: 2022-03-22

## 2022-03-22 PROBLEM — D22.5 MELANOCYTIC NEVI OF TRUNK: Status: ACTIVE | Noted: 2022-03-22

## 2022-03-22 PROCEDURE — ? BIOPSY BY SHAVE METHOD

## 2022-03-22 PROCEDURE — ? COUNSELING

## 2022-03-22 PROCEDURE — 11102 TANGNTL BX SKIN SINGLE LES: CPT

## 2022-03-22 PROCEDURE — 99203 OFFICE O/P NEW LOW 30 MIN: CPT | Mod: 25

## 2022-03-22 ASSESSMENT — LOCATION SIMPLE DESCRIPTION DERM
LOCATION SIMPLE: LEFT SHOULDER
LOCATION SIMPLE: UPPER BACK
LOCATION SIMPLE: RIGHT UPPER BACK
LOCATION SIMPLE: ABDOMEN
LOCATION SIMPLE: RIGHT ANTERIOR NECK
LOCATION SIMPLE: RIGHT SHOULDER
LOCATION SIMPLE: CHEST

## 2022-03-22 ASSESSMENT — LOCATION DETAILED DESCRIPTION DERM
LOCATION DETAILED: PERIUMBILICAL SKIN
LOCATION DETAILED: RIGHT MEDIAL UPPER BACK
LOCATION DETAILED: LEFT POSTERIOR SHOULDER
LOCATION DETAILED: RIGHT MID-UPPER BACK
LOCATION DETAILED: RIGHT POSTERIOR SHOULDER
LOCATION DETAILED: EPIGASTRIC SKIN
LOCATION DETAILED: INFERIOR THORACIC SPINE
LOCATION DETAILED: RIGHT LATERAL INFERIOR CHEST
LOCATION DETAILED: RIGHT INFERIOR ANTERIOR NECK
LOCATION DETAILED: STERNUM

## 2022-03-22 ASSESSMENT — LOCATION ZONE DERM
LOCATION ZONE: ARM
LOCATION ZONE: TRUNK
LOCATION ZONE: NECK

## 2022-03-22 NOTE — PROCEDURE: BIOPSY BY SHAVE METHOD
Detail Level: Detailed
Dressing: bandage
Anesthesia Volume In Cc: 0.5
Render In Bullet Format When Appropriate: No
Curettage Text: The wound bed was treated with curettage after the biopsy was performed.
Biopsy Method: double edge Personna blade
Silver Nitrate Text: The wound bed was treated with silver nitrate after the biopsy was performed.
Notification Instructions: Patient will be notified of biopsy results. However, patient instructed to call the office if not contacted within 2 weeks.
X Size Of Lesion In Cm: 0
Electrodesiccation Text: The wound bed was treated with electrodesiccation after the biopsy was performed.
Consent: Written consent was obtained and risks were reviewed including but not limited to scarring, infection, bleeding, scabbing, incomplete removal, nerve damage and allergy to anesthesia.
Depth Of Biopsy: dermis
Accession #: PC
Electrodesiccation And Curettage Text: The wound bed was treated with electrodesiccation and curettage after the biopsy was performed.
Hemostasis: Aluminum Chloride
Billing Type: Third-Party Bill
Was A Bandage Applied: Yes
Cryotherapy Text: The wound bed was treated with cryotherapy after the biopsy was performed.
Post-Care Instructions: I reviewed with the patient in detail post-care instructions. Patient is to keep the biopsy site dry overnight, and then apply bacitracin twice daily until healed. Patient may apply hydrogen peroxide soaks to remove any crusting.
Wound Care: Petrolatum
Anesthesia Type: 1% lidocaine without epinephrine and a 1:10 solution of 8.4% sodium bicarbonate
Information: Selecting Yes will display possible errors in your note based on the variables you have selected. This validation is only offered as a suggestion for you. PLEASE NOTE THAT THE VALIDATION TEXT WILL BE REMOVED WHEN YOU FINALIZE YOUR NOTE. IF YOU WANT TO FAX A PRELIMINARY NOTE YOU WILL NEED TO TOGGLE THIS TO 'NO' IF YOU DO NOT WANT IT IN YOUR FAXED NOTE.
Biopsy Type: H and E
Type Of Destruction Used: Curettage

## 2022-05-23 ENCOUNTER — OFFICE VISIT (OUTPATIENT)
Dept: FAMILY MEDICINE CLINIC | Facility: CLINIC | Age: 57
End: 2022-05-23
Payer: COMMERCIAL

## 2022-05-23 VITALS
WEIGHT: 238.6 LBS | HEART RATE: 94 BPM | DIASTOLIC BLOOD PRESSURE: 80 MMHG | SYSTOLIC BLOOD PRESSURE: 128 MMHG | BODY MASS INDEX: 31.48 KG/M2 | RESPIRATION RATE: 17 BRPM | OXYGEN SATURATION: 96 %

## 2022-05-23 DIAGNOSIS — I10 PRIMARY HYPERTENSION: Primary | ICD-10-CM

## 2022-05-23 PROCEDURE — 3017F COLORECTAL CA SCREEN DOC REV: CPT | Performed by: FAMILY MEDICINE

## 2022-05-23 PROCEDURE — G8427 DOCREV CUR MEDS BY ELIG CLIN: HCPCS | Performed by: FAMILY MEDICINE

## 2022-05-23 PROCEDURE — 99213 OFFICE O/P EST LOW 20 MIN: CPT | Performed by: FAMILY MEDICINE

## 2022-05-23 PROCEDURE — G8419 CALC BMI OUT NRM PARAM NOF/U: HCPCS | Performed by: FAMILY MEDICINE

## 2022-05-23 PROCEDURE — 4004F PT TOBACCO SCREEN RCVD TLK: CPT | Performed by: FAMILY MEDICINE

## 2022-05-31 ASSESSMENT — ENCOUNTER SYMPTOMS
BACK PAIN: 0
VOMITING: 0
WHEEZING: 0
EYE REDNESS: 0
GASTROINTESTINAL NEGATIVE: 1
SORE THROAT: 0
EYES NEGATIVE: 1
SHORTNESS OF BREATH: 0
ABDOMINAL DISTENTION: 0
RESPIRATORY NEGATIVE: 1
RHINORRHEA: 0

## 2022-05-31 NOTE — PROGRESS NOTES
Subjective:      Patient ID: Cyril Simpson is a 62 y.o. male. Hypertension  This is a recurrent problem. The problem has been gradually improving since onset. Pertinent negatives include no chest pain, palpitations or shortness of breath. Compliance problems include exercise and diet. Allergies   Allergen Reactions    Codeine Nausea Only     Current Outpatient Medications   Medication Sig    fluticasone (FLONASE) 50 MCG/ACT nasal spray 2 sprays by Nasal route daily    glucosamine-chondroitin 500-400 MG CAPS Take 1 capsule by mouth daily    losartan (COZAAR) 100 MG tablet Take 100 mg by mouth daily     No current facility-administered medications for this visit. Past Medical History:   Diagnosis Date    Acute frontal sinusitis     Depression 2014    HLD (hyperlipidemia) 2014    HTN (hypertension) 2014    Other symptoms referable to back     Other testicular hypofunction     Pain in joint, lower leg     Shoulder pain      Past Surgical History:   Procedure Laterality Date    COLONOSCOPY  2015    ORTHOPEDIC SURGERY Right     shoulder surgey    ORTHOPEDIC SURGERY Right     ankle    ORTHOPEDIC SURGERY Right     knee     Social History     Socioeconomic History    Marital status:      Spouse name: Not on file    Number of children: Not on file    Years of education: Not on file    Highest education level: Not on file   Occupational History    Not on file   Tobacco Use    Smoking status: Former Smoker     Packs/day: 1.00     Quit date: 1986     Years since quittin.4    Smokeless tobacco: Never Used   Substance and Sexual Activity    Alcohol use:  Yes     Alcohol/week: 5.0 standard drinks    Drug use: No    Sexual activity: Not on file   Other Topics Concern    Not on file   Social History Narrative    Not on file     Social Determinants of Health     Financial Resource Strain:     Difficulty of Paying Living Expenses: Not on file Food Insecurity:     Worried About Running Out of Food in the Last Year: Not on file    Maryjane of Food in the Last Year: Not on file   Transportation Needs:     Lack of Transportation (Medical): Not on file    Lack of Transportation (Non-Medical): Not on file   Physical Activity:     Days of Exercise per Week: Not on file    Minutes of Exercise per Session: Not on file   Stress:     Feeling of Stress : Not on file   Social Connections:     Frequency of Communication with Friends and Family: Not on file    Frequency of Social Gatherings with Friends and Family: Not on file    Attends Synagogue Services: Not on file    Active Member of [address] [place] [address] [employer] or Organizations: Not on file    Attends Club or Organization Meetings: Not on file    Marital Status: Not on file   Intimate Partner Violence:     Fear of Current or Ex-Partner: Not on file    Emotionally Abused: Not on file    Physically Abused: Not on file    Sexually Abused: Not on file   Housing Stability:     Unable to Pay for Housing in the Last Year: Not on file    Number of [place] in the Last Year: Not on file    Unstable Housing in the Last Year: Not on file       Review of Systems   Constitutional: Negative for activity change, appetite change, chills, fatigue and unexpected weight change. HENT: Negative. Negative for rhinorrhea and sore throat. Eyes: Negative. Negative for redness and visual disturbance. Respiratory: Negative. Negative for shortness of breath and wheezing. Cardiovascular: Negative. Negative for chest pain and palpitations. Gastrointestinal: Negative. Negative for abdominal distention and vomiting. Endocrine: Negative. Genitourinary: Negative. Negative for difficulty urinating. Musculoskeletal: Negative. Negative for arthralgias and back pain. Skin: Negative. Negative for rash. Neurological: Negative. Psychiatric/Behavioral: Negative. Negative for agitation and behavioral problems. Blood pressure 128/80, pulse 94, resp. rate 17, weight 238 lb 9.6 oz (108.2 kg), SpO2 96 %. Objective:   Physical Exam  Constitutional:       Appearance: Normal appearance. He is normal weight. HENT:      Head: Normocephalic. Right Ear: Tympanic membrane and ear canal normal.      Left Ear: Tympanic membrane and ear canal normal.      Nose: Nose normal. No congestion or rhinorrhea. Mouth/Throat:      Mouth: Mucous membranes are moist.      Pharynx: Oropharynx is clear. No oropharyngeal exudate or posterior oropharyngeal erythema. Cardiovascular:      Rate and Rhythm: Normal rate and regular rhythm. Heart sounds: No murmur heard. Pulmonary:      Effort: Pulmonary effort is normal. No respiratory distress. Breath sounds: Normal breath sounds. No stridor. No wheezing, rhonchi or rales. Abdominal:      General: Abdomen is flat. Bowel sounds are normal. There is no distension. Palpations: Abdomen is soft. Tenderness: There is no abdominal tenderness. There is no rebound. Lymphadenopathy:      Cervical: No cervical adenopathy. Neurological:      Mental Status: He is alert. Assessment / Plan:   Diagnoses and all orders for this visit:  Primary hypertension  GOOD CONTROL , CONTINUE CURRENT PLAN , MEDS , DIET AND EXERCISE .

## 2022-08-19 DIAGNOSIS — I10 PRIMARY HYPERTENSION: Primary | ICD-10-CM

## 2022-08-19 DIAGNOSIS — E78.5 HYPERLIPIDEMIA, UNSPECIFIED HYPERLIPIDEMIA TYPE: ICD-10-CM

## 2022-08-19 DIAGNOSIS — Z12.5 SCREENING FOR MALIGNANT NEOPLASM OF PROSTATE: ICD-10-CM

## 2022-08-22 ENCOUNTER — NURSE ONLY (OUTPATIENT)
Dept: FAMILY MEDICINE CLINIC | Facility: CLINIC | Age: 57
End: 2022-08-22

## 2022-08-22 DIAGNOSIS — I10 PRIMARY HYPERTENSION: ICD-10-CM

## 2022-08-22 DIAGNOSIS — E78.5 HYPERLIPIDEMIA, UNSPECIFIED HYPERLIPIDEMIA TYPE: ICD-10-CM

## 2022-08-22 DIAGNOSIS — Z12.5 SCREENING FOR MALIGNANT NEOPLASM OF PROSTATE: ICD-10-CM

## 2022-08-22 LAB
ALBUMIN SERPL-MCNC: 3.7 G/DL (ref 3.5–5)
ALBUMIN/GLOB SERPL: 1.3 {RATIO} (ref 1.2–3.5)
ALP SERPL-CCNC: 90 U/L (ref 50–136)
ALT SERPL-CCNC: 27 U/L (ref 12–65)
ANION GAP SERPL CALC-SCNC: ABNORMAL MMOL/L (ref 7–16)
AST SERPL-CCNC: 17 U/L (ref 15–37)
BILIRUB SERPL-MCNC: 0.5 MG/DL (ref 0.2–1.1)
BUN SERPL-MCNC: 15 MG/DL (ref 6–23)
CALCIUM SERPL-MCNC: 8.4 MG/DL (ref 8.3–10.4)
CHLORIDE SERPL-SCNC: 109 MMOL/L (ref 98–107)
CHOLEST SERPL-MCNC: 196 MG/DL
CO2 SERPL-SCNC: 28 MMOL/L (ref 21–32)
CREAT SERPL-MCNC: 1 MG/DL (ref 0.8–1.5)
ERYTHROCYTE [DISTWIDTH] IN BLOOD BY AUTOMATED COUNT: 12.5 % (ref 11.9–14.6)
GLOBULIN SER CALC-MCNC: 2.8 G/DL (ref 2.3–3.5)
GLUCOSE SERPL-MCNC: 99 MG/DL (ref 65–100)
HCT VFR BLD AUTO: 45.4 % (ref 41.1–50.3)
HDLC SERPL-MCNC: 38 MG/DL (ref 40–60)
HDLC SERPL: 5.2 {RATIO}
HGB BLD-MCNC: 15.2 G/DL (ref 13.6–17.2)
LDLC SERPL CALC-MCNC: 135.8 MG/DL
MCH RBC QN AUTO: 31.9 PG (ref 26.1–32.9)
MCHC RBC AUTO-ENTMCNC: 33.5 G/DL (ref 31.4–35)
MCV RBC AUTO: 95.4 FL (ref 79.6–97.8)
NRBC # BLD: 0 K/UL (ref 0–0.2)
PLATELET # BLD AUTO: 174 K/UL (ref 150–450)
PMV BLD AUTO: 11.3 FL (ref 9.4–12.3)
POTASSIUM SERPL-SCNC: 4.1 MMOL/L (ref 3.5–5.1)
PROT SERPL-MCNC: 6.5 G/DL (ref 6.3–8.2)
PSA SERPL-MCNC: 0.5 NG/ML
RBC # BLD AUTO: 4.76 M/UL (ref 4.23–5.6)
SODIUM SERPL-SCNC: 136 MMOL/L (ref 136–145)
TRIGL SERPL-MCNC: 111 MG/DL (ref 35–150)
TSH, 3RD GENERATION: 2.16 UIU/ML (ref 0.36–3.74)
VLDLC SERPL CALC-MCNC: 22.2 MG/DL (ref 6–23)
WBC # BLD AUTO: 4.4 K/UL (ref 4.3–11.1)

## 2022-08-23 LAB
APPEARANCE UR: CLEAR
BACTERIA URNS QL MICRO: 0 /HPF
BILIRUB UR QL: NEGATIVE
CASTS URNS QL MICRO: 0 /LPF
COLOR UR: YELLOW
CRYSTALS URNS QL MICRO: 0 /LPF
EPI CELLS #/AREA URNS HPF: 0 /HPF
GLUCOSE UR STRIP.AUTO-MCNC: NEGATIVE MG/DL
HGB UR QL STRIP: NEGATIVE
KETONES UR QL STRIP.AUTO: NEGATIVE MG/DL
LEUKOCYTE ESTERASE UR QL STRIP.AUTO: NEGATIVE
MUCOUS THREADS URNS QL MICRO: 0 /LPF
NITRITE UR QL STRIP.AUTO: NEGATIVE
PH UR STRIP: 6 [PH] (ref 5–9)
PROT UR STRIP-MCNC: NEGATIVE MG/DL
RBC #/AREA URNS HPF: 0 /HPF
SP GR UR REFRACTOMETRY: <1.005 (ref 1–1.02)
URINE CULTURE IF INDICATED: NORMAL
UROBILINOGEN UR QL STRIP.AUTO: 0.2 EU/DL (ref 0.2–1)
WBC URNS QL MICRO: 0 /HPF

## 2022-08-29 ENCOUNTER — OFFICE VISIT (OUTPATIENT)
Dept: FAMILY MEDICINE CLINIC | Facility: CLINIC | Age: 57
End: 2022-08-29
Payer: COMMERCIAL

## 2022-08-29 VITALS
HEART RATE: 75 BPM | DIASTOLIC BLOOD PRESSURE: 80 MMHG | BODY MASS INDEX: 32.72 KG/M2 | SYSTOLIC BLOOD PRESSURE: 122 MMHG | OXYGEN SATURATION: 97 % | WEIGHT: 248 LBS

## 2022-08-29 DIAGNOSIS — E78.5 HYPERLIPIDEMIA, UNSPECIFIED HYPERLIPIDEMIA TYPE: ICD-10-CM

## 2022-08-29 DIAGNOSIS — I10 ESSENTIAL (PRIMARY) HYPERTENSION: Primary | ICD-10-CM

## 2022-08-29 DIAGNOSIS — J30.1 SEASONAL ALLERGIC RHINITIS DUE TO POLLEN: ICD-10-CM

## 2022-08-29 DIAGNOSIS — R93.1 ELEVATED CORONARY ARTERY CALCIUM SCORE: ICD-10-CM

## 2022-08-29 DIAGNOSIS — Z00.00 ENCOUNTER FOR GENERAL ADULT MEDICAL EXAMINATION WITHOUT ABNORMAL FINDINGS: ICD-10-CM

## 2022-08-29 PROCEDURE — 99396 PREV VISIT EST AGE 40-64: CPT | Performed by: FAMILY MEDICINE

## 2022-09-11 ASSESSMENT — ENCOUNTER SYMPTOMS
EYES NEGATIVE: 1
RESPIRATORY NEGATIVE: 1
SHORTNESS OF BREATH: 0
GASTROINTESTINAL NEGATIVE: 1
CHEST TIGHTNESS: 0

## 2022-09-11 NOTE — PROGRESS NOTES
HISTORY OF PRESENT ILLNESS  Celeste Clark is a 62 y.o. y.o. male    Hypertension  This is a recurrent problem. The problem has been gradually improving since onset. The problem is controlled. Pertinent negatives include no chest pain, palpitations or shortness of breath. Hyperlipidemia  This is a recurrent problem. The current episode started more than 1 year ago. The problem is controlled. Recent lipid tests were reviewed and are normal. Pertinent negatives include no chest pain or shortness of breath. Coronary Artery Disease  Presents for follow-up (slight elevation of calcium scan at 4) visit. Pertinent negatives include no chest pain, chest pressure, chest tightness, dizziness, palpitations or shortness of breath. Risk factors include hyperlipidemia. The symptoms have been stable. Compliance with diet is good. Compliance with exercise is good. Compliance with medications is good. Allergies   Allergen Reactions    Codeine Nausea Only        Current Outpatient Medications   Medication Sig    fluticasone (FLONASE) 50 MCG/ACT nasal spray 2 sprays by Nasal route daily    glucosamine-chondroitin 500-400 MG CAPS Take 1 capsule by mouth daily    losartan (COZAAR) 100 MG tablet Take 100 mg by mouth daily     No current facility-administered medications for this visit.         Past Medical History:   Diagnosis Date    Acute frontal sinusitis     Age-related cataract of right eye     Depression 1/27/2014    HLD (hyperlipidemia) 1/27/2014    HTN (hypertension) 1/27/2014    Other symptoms referable to back     Other testicular hypofunction     Pain in joint, lower leg     Shoulder pain         Past Surgical History:   Procedure Laterality Date    COLONOSCOPY  07/27/2015    ORTHOPEDIC SURGERY Right 12/13    shoulder surgey    ORTHOPEDIC SURGERY Right 2012    ankle    ORTHOPEDIC SURGERY Right 1999    knee        Social History     Socioeconomic History    Marital status:      Spouse name: Not on file Number of children: Not on file    Years of education: Not on file    Highest education level: Not on file   Occupational History    Not on file   Tobacco Use    Smoking status: Former     Packs/day: 1.00     Types: Cigarettes     Quit date: 1986     Years since quittin.7    Smokeless tobacco: Never   Substance and Sexual Activity    Alcohol use: Yes     Alcohol/week: 5.0 standard drinks    Drug use: No    Sexual activity: Not on file   Other Topics Concern    Not on file   Social History Narrative    Not on file     Social Determinants of Health     Financial Resource Strain: Not on file   Food Insecurity: Not on file   Transportation Needs: Not on file   Physical Activity: Not on file   Stress: Not on file   Social Connections: Not on file   Intimate Partner Violence: Not on file   Housing Stability: Not on file        Review of Systems   Constitutional: Negative. HENT: Negative. Eyes: Negative. Respiratory: Negative. Negative for chest tightness and shortness of breath. Cardiovascular: Negative. Negative for chest pain and palpitations. Gastrointestinal: Negative. Endocrine: Negative. Genitourinary: Negative. Skin: Negative. Neurological: Negative. Negative for dizziness. Psychiatric/Behavioral: Negative. /80   Pulse 75   Wt 248 lb (112.5 kg)   SpO2 97%   BMI 32.72 kg/m²      Physical Exam  Constitutional:       General: He is not in acute distress. Appearance: Normal appearance. HENT:      Head: Normocephalic. Nose: Nose normal.   Eyes:      Conjunctiva/sclera: Conjunctivae normal.   Cardiovascular:      Rate and Rhythm: Normal rate. Pulmonary:      Effort: Pulmonary effort is normal.      Breath sounds: Normal breath sounds. Abdominal:      General: Abdomen is flat. Bowel sounds are normal.      Palpations: Abdomen is soft. Musculoskeletal:         General: Normal range of motion. Cervical back: Normal range of motion.    Skin: General: Skin is warm and dry. Neurological:      General: No focal deficit present. Mental Status: He is alert. Psychiatric:         Mood and Affect: Mood normal.       Nurse Only on 08/22/2022   Component Date Value Ref Range Status    WBC 08/22/2022 4.4  4.3 - 11.1 K/uL Final    RBC 08/22/2022 4.76  4.23 - 5.6 M/uL Final    Hemoglobin 08/22/2022 15.2  13.6 - 17.2 g/dL Final    Hematocrit 08/22/2022 45.4  41.1 - 50.3 % Final    MCV 08/22/2022 95.4  79.6 - 97.8 FL Final    MCH 08/22/2022 31.9  26.1 - 32.9 PG Final    MCHC 08/22/2022 33.5  31.4 - 35.0 g/dL Final    RDW 08/22/2022 12.5  11.9 - 14.6 % Final    Platelets 63/84/0333 174  150 - 450 K/uL Final    MPV 08/22/2022 11.3  9.4 - 12.3 FL Final    nRBC 08/22/2022 0.00  0.0 - 0.2 K/uL Final    **Note: Absolute NRBC parameter is now reported with Hemogram**    Sodium 08/22/2022 136  136 - 145 mmol/L Final    Potassium 08/22/2022 4.1  3.5 - 5.1 mmol/L Final    Chloride 08/22/2022 109 (A) 98 - 107 mmol/L Final    CO2 08/22/2022 28  21 - 32 mmol/L Final    Anion Gap 08/22/2022 Cannot be calculated  7 - 16 mmol/L Final    Glucose 08/22/2022 99  65 - 100 mg/dL Final    BUN 08/22/2022 15  6 - 23 MG/DL Final    Creatinine 08/22/2022 1.00  0.8 - 1.5 MG/DL Final    GFR  08/22/2022 >60  >60 ml/min/1.73m2 Final    GFR Non- 08/22/2022 >60  >60 ml/min/1.73m2 Final    Comment:   Estimated GFR is calculated using the Modification of Diet in Renal Disease (MDRD) Study equation, reported for both  Americans (GFRAA) and non- Americans (GFRNA), and normalized to 1.73m2 body surface area. The physician must decide which value applies to the patient. The MDRD study equation should only be used in individuals age 25 or older. It has not been validated for the following: pregnant women, patients with serious comorbid conditions,or on certain medications, or persons with extremes of body size, muscle mass, or nutritional status. Calcium 08/22/2022 8.4  8.3 - 10.4 MG/DL Final    Total Bilirubin 08/22/2022 0.5  0.2 - 1.1 MG/DL Final    ALT 08/22/2022 27  12 - 65 U/L Final    AST 08/22/2022 17  15 - 37 U/L Final    Alk Phosphatase 08/22/2022 90  50 - 136 U/L Final    Total Protein 08/22/2022 6.5  6.3 - 8.2 g/dL Final    Albumin 08/22/2022 3.7  3.5 - 5.0 g/dL Final    Globulin 08/22/2022 2.8  2.3 - 3.5 g/dL Final    Albumin/Globulin Ratio 08/22/2022 1.3  1.2 - 3.5   Final    TSH, 3RD GENERATION 08/22/2022 2.160  0.358 - 3.740 uIU/mL Final    PSA 08/22/2022 0.5  <4.0 ng/mL Final    Comment: [employer].   New method in use, please reestablish patient baseline      Cholesterol, Total 08/22/2022 196  <200 MG/DL Final    Comment: Borderline High: 200-239 mg/dL  High: Greater than or equal to 240 mg/dL      Triglycerides 08/22/2022 111  35 - 150 MG/DL Final    Comment: Borderline High: 150-199 mg/dL, High: 200-499 mg/dL  Very High: Greater than or equal to 500 mg/dL      HDL 08/22/2022 38 (A) 40 - 60 MG/DL Final    LDL Calculated 08/22/2022 135.8 (A) <100 MG/DL Final    Comment: Near Optimal: 100-129 mg/dL  Borderline High: 130-159, High: 160-189 mg/dL  Very High: Greater than or equal to 190 mg/dL      VLDL Cholesterol Calculated 08/22/2022 22.2  6.0 - 23.0 MG/DL Final    Chol/HDL Ratio 08/22/2022 5.2    Final    Color, UA 08/22/2022 YELLOW    Final    Appearance 08/22/2022 CLEAR    Final    Specific Gravity, UA 08/22/2022 <1.005  1.001 - 1.023 Final    pH, Urine 08/22/2022 6.0  5.0 - 9.0   Final    Protein, UA 08/22/2022 Negative  Negative mg/dL Final    Glucose, UA 08/22/2022 Negative  Negative mg/dL Final    Ketones, Urine 08/22/2022 Negative  Negative mg/dL Final    Bilirubin Urine 08/22/2022 Negative  Negative   Final    Blood, Urine 08/22/2022 Negative  Negative   Final    Urobilinogen, Urine 08/22/2022 0.2  0.2 - 1.0 EU/dL Final    Nitrite, Urine 08/22/2022 Negative  Negative   Final    Leukocyte Esterase, Urine 08/22/2022 Negative  Negative   Final    WBC, UA 08/22/2022 0  0 /hpf Final    RBC, UA 08/22/2022 0  0 /hpf Final    BACTERIA, URINE 08/22/2022 0  0 /hpf Final    Urine Culture if Indicated 08/22/2022 CULTURE NOT INDICATED BY UA RESULT    Final    Epithelial Cells UA 08/22/2022 0  0 /hpf Final    Casts 08/22/2022 0  0 /lpf Final    Crystals 08/22/2022 0  0 /LPF Final    Mucus, UA 08/22/2022 0  0 /lpf Final       ASSESSMENT and PLAN    Essential (primary) hypertension  Hyperlipidemia, unspecified hyperlipidemia type  Seasonal allergic rhinitis due to pollen  Encounter for general adult medical examination without abnormal findings  Elevated coronary artery calcium score  The patient was seen today for the annual preventive health visit. The patient was provided anticipatory guidance and counseling regarding age / sex appropriate health maintenance issues including vaccinations, blood pressure screening, lipid screening, cancer screenings, diet, exercise, avoidance of tobacco / substance abuse. Current treatment plan is effective. no changes in therapy  lab results and schedule for future lab studies reviewed with patient  reviewed diet, exercise and weight control   Cardiovascular risk and specific lipid/ LDL goals reviewed  use of aspirin to prevent MI and TIA's discussed    No follow-ups on file.      Lelo Garcia MD

## 2023-02-07 NOTE — TELEPHONE ENCOUNTER
Refill: Losartan  Dosage: 100 mg  Freq: qd   To:  [facility]    Due insurance change, this is patient's new pharmacy.

## 2023-02-08 RX ORDER — LOSARTAN POTASSIUM 100 MG/1
100 TABLET ORAL DAILY
Qty: 90 TABLET | Refills: 0 | Status: SHIPPED | OUTPATIENT
Start: 2023-02-08

## 2023-02-28 SDOH — ECONOMIC STABILITY: FOOD INSECURITY: WITHIN THE PAST 12 MONTHS, THE FOOD YOU BOUGHT JUST DIDN'T LAST AND YOU DIDN'T HAVE MONEY TO GET MORE.: NEVER TRUE

## 2023-02-28 SDOH — ECONOMIC STABILITY: HOUSING INSECURITY
IN THE LAST 12 MONTHS, WAS THERE A TIME WHEN YOU DID NOT HAVE A STEADY PLACE TO SLEEP OR SLEPT IN A SHELTER (INCLUDING NOW)?: NO

## 2023-02-28 SDOH — ECONOMIC STABILITY: FOOD INSECURITY: WITHIN THE PAST 12 MONTHS, YOU WORRIED THAT YOUR FOOD WOULD RUN OUT BEFORE YOU GOT MONEY TO BUY MORE.: NEVER TRUE

## 2023-02-28 SDOH — ECONOMIC STABILITY: INCOME INSECURITY: HOW HARD IS IT FOR YOU TO PAY FOR THE VERY BASICS LIKE FOOD, HOUSING, MEDICAL CARE, AND HEATING?: NOT HARD AT ALL

## 2023-02-28 SDOH — ECONOMIC STABILITY: TRANSPORTATION INSECURITY
IN THE PAST 12 MONTHS, HAS LACK OF TRANSPORTATION KEPT YOU FROM MEETINGS, WORK, OR FROM GETTING THINGS NEEDED FOR DAILY LIVING?: NO

## 2023-03-01 ENCOUNTER — OFFICE VISIT (OUTPATIENT)
Dept: FAMILY MEDICINE CLINIC | Facility: CLINIC | Age: 58
End: 2023-03-01
Payer: COMMERCIAL

## 2023-03-01 VITALS
HEIGHT: 73 IN | SYSTOLIC BLOOD PRESSURE: 126 MMHG | HEART RATE: 86 BPM | DIASTOLIC BLOOD PRESSURE: 80 MMHG | TEMPERATURE: 97.3 F | BODY MASS INDEX: 33.8 KG/M2 | OXYGEN SATURATION: 97 % | WEIGHT: 255 LBS

## 2023-03-01 DIAGNOSIS — I10 ESSENTIAL (PRIMARY) HYPERTENSION: Primary | ICD-10-CM

## 2023-03-01 DIAGNOSIS — M72.2 PLANTAR FASCIITIS: ICD-10-CM

## 2023-03-01 PROCEDURE — 3079F DIAST BP 80-89 MM HG: CPT | Performed by: FAMILY MEDICINE

## 2023-03-01 PROCEDURE — 99214 OFFICE O/P EST MOD 30 MIN: CPT | Performed by: FAMILY MEDICINE

## 2023-03-01 PROCEDURE — 3074F SYST BP LT 130 MM HG: CPT | Performed by: FAMILY MEDICINE

## 2023-03-01 RX ORDER — METHYLPREDNISOLONE 4 MG/1
TABLET ORAL
Qty: 1 KIT | Refills: 0 | Status: SHIPPED | OUTPATIENT
Start: 2023-03-01 | End: 2023-03-07

## 2023-03-01 ASSESSMENT — PATIENT HEALTH QUESTIONNAIRE - PHQ9
3. TROUBLE FALLING OR STAYING ASLEEP: 0
4. FEELING TIRED OR HAVING LITTLE ENERGY: 0
2. FEELING DOWN, DEPRESSED OR HOPELESS: 0
10. IF YOU CHECKED OFF ANY PROBLEMS, HOW DIFFICULT HAVE THESE PROBLEMS MADE IT FOR YOU TO DO YOUR WORK, TAKE CARE OF THINGS AT HOME, OR GET ALONG WITH OTHER PEOPLE: 0
8. MOVING OR SPEAKING SO SLOWLY THAT OTHER PEOPLE COULD HAVE NOTICED. OR THE OPPOSITE, BEING SO FIGETY OR RESTLESS THAT YOU HAVE BEEN MOVING AROUND A LOT MORE THAN USUAL: 0
7. TROUBLE CONCENTRATING ON THINGS, SUCH AS READING THE NEWSPAPER OR WATCHING TELEVISION: 0
SUM OF ALL RESPONSES TO PHQ9 QUESTIONS 1 & 2: 0
SUM OF ALL RESPONSES TO PHQ QUESTIONS 1-9: 0
1. LITTLE INTEREST OR PLEASURE IN DOING THINGS: 0
SUM OF ALL RESPONSES TO PHQ QUESTIONS 1-9: 0
6. FEELING BAD ABOUT YOURSELF - OR THAT YOU ARE A FAILURE OR HAVE LET YOURSELF OR YOUR FAMILY DOWN: 0
9. THOUGHTS THAT YOU WOULD BE BETTER OFF DEAD, OR OF HURTING YOURSELF: 0
SUM OF ALL RESPONSES TO PHQ QUESTIONS 1-9: 0
SUM OF ALL RESPONSES TO PHQ QUESTIONS 1-9: 0
5. POOR APPETITE OR OVEREATING: 0

## 2023-03-01 ASSESSMENT — ENCOUNTER SYMPTOMS
GASTROINTESTINAL NEGATIVE: 1
EYES NEGATIVE: 1
RESPIRATORY NEGATIVE: 1

## 2023-03-01 NOTE — PROGRESS NOTES
HISTORY OF PRESENT ILLNESS  Gopi Ayoub is a 62 y.o. y.o. male    Hypertension  This is a recurrent (will try taking 1/2 bid due to elevated bp in am .) problem. The problem has been gradually improving since onset. The problem is controlled. Foot Pain   This is a new (left heel pain) problem. The problem has been unchanged. Allergies   Allergen Reactions    Codeine Nausea Only        Current Outpatient Medications   Medication Sig    methylPREDNISolone (MEDROL DOSEPACK) 4 MG tablet Take by mouth.    losartan (COZAAR) 100 MG tablet Take 1 tablet by mouth daily    fluticasone (FLONASE) 50 MCG/ACT nasal spray 2 sprays by Nasal route daily    glucosamine-chondroitin 500-400 MG CAPS Take 1 capsule by mouth daily (Patient not taking: Reported on 3/1/2023)     No current facility-administered medications for this visit. Past Medical History:   Diagnosis Date    Acute frontal sinusitis     Age-related cataract of right eye     Depression 2014    HLD (hyperlipidemia) 2014    HTN (hypertension) 2014    Other symptoms referable to back     Other testicular hypofunction     Pain in joint, lower leg     Shoulder pain         Past Surgical History:   Procedure Laterality Date    COLONOSCOPY  2015    ORTHOPEDIC SURGERY Right     shoulder surgey    ORTHOPEDIC SURGERY Right     ankle    ORTHOPEDIC SURGERY Right     knee        Social History     Socioeconomic History    Marital status:      Spouse name: Not on file    Number of children: Not on file    Years of education: Not on file    Highest education level: Not on file   Occupational History    Not on file   Tobacco Use    Smoking status: Former     Packs/day: 1.00     Types: Cigarettes     Quit date: 1986     Years since quittin.1    Smokeless tobacco: Never   Substance and Sexual Activity    Alcohol use:  Yes     Alcohol/week: 5.0 standard drinks    Drug use: No    Sexual activity: Not on file   Other Topics Concern    Not on file   Social History Narrative    Not on file     Social Determinants of Health     Financial Resource Strain: Low Risk     Difficulty of Paying Living Expenses: Not hard at all   Food Insecurity: No Food Insecurity    Worried About [address] in the Last Year: Never true    [address] [facility] [address] in the Last Year: Never true   Transportation Needs: Unknown    Lack of Transportation (Medical): Not on file    Lack of Transportation (Non-Medical): No   Physical Activity: Not on file   Stress: Not on file   Social Connections: Not on file   Intimate Partner Violence: Not on file   Housing Stability: Unknown    Unable to Pay for Housing in the Last Year: Not on file    Number of Places Lived in the Last Year: Not on file    Unstable Housing in the Last Year: No        Review of Systems   Constitutional: Negative. HENT: Negative. Eyes: Negative. Respiratory: Negative. Cardiovascular: Negative. Gastrointestinal: Negative. Endocrine: Negative. Genitourinary: Negative. Skin: Negative. Neurological: Negative. Psychiatric/Behavioral: Negative. /80   Pulse 86   Temp 97.3 °F (36.3 °C) (Temporal)   Ht 6' 1\" (1.854 m)   Wt 255 lb (115.7 kg)   SpO2 97%   BMI 33.64 kg/m²      Physical Exam  Constitutional:       General: He is not in acute distress. Appearance: Normal appearance. HENT:      Head: Normocephalic. Nose: Nose normal.   Eyes:      Conjunctiva/sclera: Conjunctivae normal.   Cardiovascular:      Rate and Rhythm: Normal rate. Pulmonary:      Effort: Pulmonary effort is normal.      Breath sounds: Normal breath sounds. Abdominal:      General: Abdomen is flat. Bowel sounds are normal.      Palpations: Abdomen is soft. Musculoskeletal:         General: Normal range of motion. Cervical back: Normal range of motion. Skin:     General: Skin is warm and dry. Neurological:      General: No focal deficit present.       Mental Status: He is alert. Psychiatric:         Mood and Affect: Mood normal.       Nurse Only on 08/22/2022   Component Date Value Ref Range Status    WBC 08/22/2022 4.4  4.3 - 11.1 K/uL Final    RBC 08/22/2022 4.76  4.23 - 5.6 M/uL Final    Hemoglobin 08/22/2022 15.2  13.6 - 17.2 g/dL Final    Hematocrit 08/22/2022 45.4  41.1 - 50.3 % Final    MCV 08/22/2022 95.4  79.6 - 97.8 FL Final    MCH 08/22/2022 31.9  26.1 - 32.9 PG Final    MCHC 08/22/2022 33.5  31.4 - 35.0 g/dL Final    RDW 08/22/2022 12.5  11.9 - 14.6 % Final    Platelets 79/92/5225 174  150 - 450 K/uL Final    MPV 08/22/2022 11.3  9.4 - 12.3 FL Final    nRBC 08/22/2022 0.00  0.0 - 0.2 K/uL Final    **Note: Absolute NRBC parameter is now reported with Hemogram**    Sodium 08/22/2022 136  136 - 145 mmol/L Final    Potassium 08/22/2022 4.1  3.5 - 5.1 mmol/L Final    Chloride 08/22/2022 109 (A)  98 - 107 mmol/L Final    CO2 08/22/2022 28  21 - 32 mmol/L Final    Anion Gap 08/22/2022 Cannot be calculated  7 - 16 mmol/L Final    Glucose 08/22/2022 99  65 - 100 mg/dL Final    BUN 08/22/2022 15  6 - 23 MG/DL Final    Creatinine 08/22/2022 1.00  0.8 - 1.5 MG/DL Final    GFR  08/22/2022 >60  >60 ml/min/1.73m2 Final    GFR Non- 08/22/2022 >60  >60 ml/min/1.73m2 Final    Comment:   Estimated GFR is calculated using the Modification of Diet in Renal Disease (MDRD) Study equation, reported for both  Americans (GFRAA) and non- Americans (GFRNA), and normalized to 1.73m2 body surface area. The physician must decide which value applies to the patient. The MDRD study equation should only be used in individuals age 25 or older. It has not been validated for the following: pregnant women, patients with serious comorbid conditions,or on certain medications, or persons with extremes of body size, muscle mass, or nutritional status.       Calcium 08/22/2022 8.4  8.3 - 10.4 MG/DL Final    Total Bilirubin 08/22/2022 0.5  0.2 - 1.1 MG/DL Final    ALT 08/22/2022 27  12 - 65 U/L Final    AST 08/22/2022 17  15 - 37 U/L Final    Alk Phosphatase 08/22/2022 90  50 - 136 U/L Final    Total Protein 08/22/2022 6.5  6.3 - 8.2 g/dL Final    Albumin 08/22/2022 3.7  3.5 - 5.0 g/dL Final    Globulin 08/22/2022 2.8  2.3 - 3.5 g/dL Final    Albumin/Globulin Ratio 08/22/2022 1.3  1.2 - 3.5   Final    TSH, 3RD GENERATION 08/22/2022 2.160  0.358 - 3.740 uIU/mL Final    PSA 08/22/2022 0.5  <4.0 ng/mL Final    Comment: [employer].   New method in use, please reestablish patient baseline      Cholesterol, Total 08/22/2022 196  <200 MG/DL Final    Comment: Borderline High: 200-239 mg/dL  High: Greater than or equal to 240 mg/dL      Triglycerides 08/22/2022 111  35 - 150 MG/DL Final    Comment: Borderline High: 150-199 mg/dL, High: 200-499 mg/dL  Very High: Greater than or equal to 500 mg/dL      HDL 08/22/2022 38 (A)  40 - 60 MG/DL Final    LDL Calculated 08/22/2022 135.8 (A)  <100 MG/DL Final    Comment: Near Optimal: 100-129 mg/dL  Borderline High: 130-159, High: 160-189 mg/dL  Very High: Greater than or equal to 190 mg/dL      VLDL Cholesterol Calculated 08/22/2022 22.2  6.0 - 23.0 MG/DL Final    Chol/HDL Ratio 08/22/2022 5.2    Final    Color, UA 08/22/2022 YELLOW    Final    Appearance 08/22/2022 CLEAR    Final    Specific Gravity, UA 08/22/2022 <1.005  1.001 - 1.023 Final    pH, Urine 08/22/2022 6.0  5.0 - 9.0   Final    Protein, UA 08/22/2022 Negative  Negative mg/dL Final    Glucose, UA 08/22/2022 Negative  Negative mg/dL Final    Ketones, Urine 08/22/2022 Negative  Negative mg/dL Final    Bilirubin Urine 08/22/2022 Negative  Negative   Final    Blood, Urine 08/22/2022 Negative  Negative   Final    Urobilinogen, Urine 08/22/2022 0.2  0.2 - 1.0 EU/dL Final    Nitrite, Urine 08/22/2022 Negative  Negative   Final    Leukocyte Esterase, Urine 08/22/2022 Negative  Negative   Final    WBC, UA 08/22/2022 0  0 /hpf Final    RBC, UA 08/22/2022 0  0 /hpf Final    BACTERIA, URINE 08/22/2022 0  0 /hpf Final    Urine Culture if Indicated 08/22/2022 CULTURE NOT INDICATED BY UA RESULT    Final    Epithelial Cells UA 08/22/2022 0  0 /hpf Final    Casts 08/22/2022 0  0 /lpf Final    Crystals 08/22/2022 0  0 /LPF Final    Mucus, UA 08/22/2022 0  0 /lpf Final       ASSESSMENT and PLAN    Essential (primary) hypertension  Plantar fasciitis      Current treatment plan is effective. no changes in therapy  lab results and schedule for future lab studies reviewed with patient  reviewed diet, exercise and weight control     No follow-ups on file.      Geoffrey Espinosa MD

## 2023-03-22 ENCOUNTER — HOSPITAL ENCOUNTER (EMERGENCY)
Age: 58
Discharge: HOME | End: 2023-03-22
Attending: EMERGENCY MEDICINE
Payer: COMMERCIAL

## 2023-03-22 ENCOUNTER — APPOINTMENT (OUTPATIENT)
Dept: ULTRASOUND IMAGING | Age: 58
End: 2023-03-22
Payer: COMMERCIAL

## 2023-03-22 ENCOUNTER — NURSE TRIAGE (OUTPATIENT)
Dept: OTHER | Facility: CLINIC | Age: 58
End: 2023-03-22

## 2023-03-22 VITALS
RESPIRATION RATE: 18 BRPM | DIASTOLIC BLOOD PRESSURE: 104 MMHG | BODY MASS INDEX: 32.98 KG/M2 | OXYGEN SATURATION: 99 % | WEIGHT: 250 LBS | SYSTOLIC BLOOD PRESSURE: 147 MMHG | HEART RATE: 79 BPM | TEMPERATURE: 98.6 F

## 2023-03-22 DIAGNOSIS — N45.1 EPIDIDYMITIS: Primary | ICD-10-CM

## 2023-03-22 LAB
APPEARANCE UR: CLEAR
BACTERIA URNS QL MICRO: 0 /HPF
BILIRUB UR QL: NEGATIVE
CASTS URNS QL MICRO: 0 /LPF
COLOR UR: YELLOW
CRYSTALS URNS QL MICRO: 0 /LPF
EPI CELLS #/AREA URNS HPF: NORMAL /HPF
GLUCOSE UR STRIP.AUTO-MCNC: NEGATIVE MG/DL
HGB UR QL STRIP: NEGATIVE
KETONES UR QL STRIP.AUTO: NEGATIVE MG/DL
LEUKOCYTE ESTERASE UR QL STRIP.AUTO: NEGATIVE
MUCOUS THREADS URNS QL MICRO: 0 /LPF
NITRITE UR QL STRIP.AUTO: NEGATIVE
OTHER OBSERVATIONS: NORMAL
PH UR STRIP: 6 (ref 5–9)
PROT UR STRIP-MCNC: ABNORMAL MG/DL
RBC #/AREA URNS HPF: NORMAL /HPF
SP GR UR REFRACTOMETRY: 1.01 (ref 1–1.02)
UROBILINOGEN UR QL STRIP.AUTO: 0.2 EU/DL (ref 0.2–1)
WBC URNS QL MICRO: NORMAL /HPF

## 2023-03-22 PROCEDURE — 76870 US EXAM SCROTUM: CPT

## 2023-03-22 PROCEDURE — 81003 URINALYSIS AUTO W/O SCOPE: CPT

## 2023-03-22 PROCEDURE — 81015 MICROSCOPIC EXAM OF URINE: CPT

## 2023-03-22 PROCEDURE — 99284 EMERGENCY DEPT VISIT MOD MDM: CPT

## 2023-03-22 RX ORDER — LEVOFLOXACIN 500 MG/1
500 TABLET, FILM COATED ORAL DAILY
Qty: 10 TABLET | Refills: 0 | Status: SHIPPED | OUTPATIENT
Start: 2023-03-22 | End: 2023-04-01

## 2023-03-22 ASSESSMENT — ENCOUNTER SYMPTOMS
COUGH: 0
BACK PAIN: 0
VOMITING: 0
SHORTNESS OF BREATH: 0
ABDOMINAL PAIN: 0
EYE DISCHARGE: 0
DIARRHEA: 0
CHEST TIGHTNESS: 0
NAUSEA: 0

## 2023-03-22 ASSESSMENT — PAIN SCALES - GENERAL: PAINLEVEL_OUTOF10: 1

## 2023-03-22 ASSESSMENT — PAIN - FUNCTIONAL ASSESSMENT: PAIN_FUNCTIONAL_ASSESSMENT: 0-10

## 2023-03-22 NOTE — ED PROVIDER NOTES
making was utilized in creating the patients health plan today. Aaron Petty is a 62 y.o. male who presents to the Emergency Department with chief complaint of    Chief Complaint   Patient presents with    Groin Swelling      51-year-old male presents emerged department via private vehicle with chief complaint of right-sided testicular discomfort for the past 2 to 3 days in duration. He reports maybe mild swelling in his testicle he denies any pain in his left testicle any urinary symptoms any penile pain he denies any abdominal pain nausea vomiting fevers chest pain shortness of breath. He denies any history of kidney stones. Review of Systems   Constitutional:  Negative for chills, fatigue and fever. HENT:  Negative for congestion, dental problem and drooling. Eyes:  Negative for discharge. Respiratory:  Negative for cough, chest tightness and shortness of breath. Cardiovascular:  Negative for chest pain and palpitations. Gastrointestinal:  Negative for abdominal pain, diarrhea, nausea and vomiting. Endocrine: Negative for polydipsia. Genitourinary:  Positive for testicular pain. Negative for difficulty urinating, dysuria and hematuria. Musculoskeletal:  Negative for back pain. Skin:  Negative for rash and wound. Neurological:  Negative for dizziness, seizures, speech difficulty, light-headedness and headaches. Psychiatric/Behavioral:  Negative for agitation. Vitals signs and nursing note reviewed. Patient Vitals for the past 4 hrs:   Temp Pulse Resp BP SpO2   03/22/23 1810 98.6 °F (37 °C) 79 18 (!) 147/104 99 %          Physical Exam  Vitals and nursing note reviewed. Constitutional:       Appearance: Normal appearance. HENT:      Head: Normocephalic and atraumatic. Right Ear: Tympanic membrane normal.      Nose: Nose normal.      Mouth/Throat:      Mouth: Mucous membranes are moist.   Eyes:      Extraocular Movements: Extraocular movements intact.

## 2023-03-22 NOTE — TELEPHONE ENCOUNTER
Location of patient: [place]    Received call from [facility] [place] with [employer]. Subjective: Caller states \"testicle swollen\"     Current Symptoms: Right testicle is swollen, no injury, no redness, is uncomfortable. Hasn't worsened since noticing. No urinary sx, no swelling lower than groin. Onset: 2 days ago; gradual    Associated Symptoms: NA    Pain Severity: 2/10; aching; constant    Temperature: no fever     What has been tried: none noted    LMP: NA Pregnant: NA    Recommended disposition: Go to ED now. Declined ot go, advised that the reason is that he needs at an U/S and office won't be able to do that and if there is a circulation issue in his testicle time is of the essence. Declined states he isn't worse that he was when it started and would not like to go. Care advice provided, patient verbalizes understanding; denies any other questions or concerns; instructed to call back for any new or worsening symptoms. Writer provided warm transfer to Luz Noel  at [facility] for ED denial    Attention Provider: Thank you for allowing me to participate in the care of your patient. The patient was connected to triage in response to information provided to the ECC/PSC. Please do not respond through this encounter as the response is not directed to a shared pool.           Reason for Disposition   Scrotum is painful or tender to touch    Protocols used: Scrotum Swelling-ADULT-[place]

## 2023-03-23 NOTE — DISCHARGE INSTRUCTIONS
Please do not do any strenuous exercise as this causes an increased risk of tendon rupture. Please follow-up with urologist as needed. Please return emerged part for any worsening symptoms.

## 2023-03-23 NOTE — ED NOTES
I have reviewed discharge instructions with the patient. The patient verbalized understanding. Patient left ED via Discharge Method: ambulatory to Home with spouse. Opportunity for questions and clarification provided. Patient given 1 scripts. To continue your aftercare when you leave the hospital, you may receive an automated call from our care team to check in on how you are doing. This is a free service and part of our promise to provide the best care and service to meet your aftercare needs.  If you have questions, or wish to unsubscribe from this service please call [phone]. Thank you for Choosing our [facility] Emergency Department.         Alicia Arriaga RN  03/22/23 5475

## 2023-06-07 RX ORDER — LOSARTAN POTASSIUM 100 MG/1
TABLET ORAL
Qty: 90 TABLET | Refills: 0 | Status: SHIPPED | OUTPATIENT
Start: 2023-06-07

## 2023-06-22 ENCOUNTER — NURSE ONLY (OUTPATIENT)
Dept: FAMILY MEDICINE CLINIC | Facility: CLINIC | Age: 58
End: 2023-06-22
Payer: COMMERCIAL

## 2023-06-22 DIAGNOSIS — Z23 IMMUNIZATION DUE: Primary | ICD-10-CM

## 2023-06-22 PROCEDURE — 90471 IMMUNIZATION ADMIN: CPT | Performed by: FAMILY MEDICINE

## 2023-06-22 PROCEDURE — 90632 HEPA VACCINE ADULT IM: CPT | Performed by: FAMILY MEDICINE

## 2023-07-26 RX ORDER — LOSARTAN POTASSIUM 100 MG/1
TABLET ORAL
Qty: 90 TABLET | Refills: 0 | OUTPATIENT
Start: 2023-07-26

## 2023-08-23 ENCOUNTER — NURSE ONLY (OUTPATIENT)
Dept: FAMILY MEDICINE CLINIC | Facility: CLINIC | Age: 58
End: 2023-08-23

## 2023-08-23 DIAGNOSIS — E78.5 HYPERLIPIDEMIA, UNSPECIFIED HYPERLIPIDEMIA TYPE: ICD-10-CM

## 2023-08-23 DIAGNOSIS — Z00.00 ENCOUNTER FOR ANNUAL PHYSICAL EXAM: Primary | ICD-10-CM

## 2023-08-23 DIAGNOSIS — I10 ESSENTIAL (PRIMARY) HYPERTENSION: ICD-10-CM

## 2023-08-23 DIAGNOSIS — Z12.5 SCREENING PSA (PROSTATE SPECIFIC ANTIGEN): ICD-10-CM

## 2023-08-23 DIAGNOSIS — Z00.00 ENCOUNTER FOR ANNUAL PHYSICAL EXAM: ICD-10-CM

## 2023-08-23 LAB
APPEARANCE UR: CLEAR
BACTERIA URNS QL MICRO: NEGATIVE /HPF
BASOPHILS # BLD: 0 K/UL (ref 0–0.2)
BASOPHILS NFR BLD: 1 % (ref 0–2)
BILIRUB UR QL: NEGATIVE
CASTS URNS QL MICRO: NORMAL /LPF (ref 0–2)
COLOR UR: NORMAL
DIFFERENTIAL METHOD BLD: ABNORMAL
EOSINOPHIL # BLD: 0.1 K/UL (ref 0–0.8)
EOSINOPHIL NFR BLD: 4 % (ref 0.5–7.8)
EPI CELLS #/AREA URNS HPF: NORMAL /HPF (ref 0–5)
ERYTHROCYTE [DISTWIDTH] IN BLOOD BY AUTOMATED COUNT: 13.1 % (ref 11.9–14.6)
GLUCOSE UR STRIP.AUTO-MCNC: NEGATIVE MG/DL
HCT VFR BLD AUTO: 45.3 % (ref 41.1–50.3)
HGB BLD-MCNC: 15.2 G/DL (ref 13.6–17.2)
HGB UR QL STRIP: NEGATIVE
IMM GRANULOCYTES # BLD AUTO: 0 K/UL (ref 0–0.5)
IMM GRANULOCYTES NFR BLD AUTO: 0 % (ref 0–5)
KETONES UR QL STRIP.AUTO: NEGATIVE MG/DL
LEUKOCYTE ESTERASE UR QL STRIP.AUTO: NEGATIVE
LYMPHOCYTES # BLD: 1.1 K/UL (ref 0.5–4.6)
LYMPHOCYTES NFR BLD: 33 % (ref 13–44)
MCH RBC QN AUTO: 30.5 PG (ref 26.1–32.9)
MCHC RBC AUTO-ENTMCNC: 33.6 G/DL (ref 31.4–35)
MCV RBC AUTO: 91 FL (ref 82–102)
MONOCYTES # BLD: 0.4 K/UL (ref 0.1–1.3)
MONOCYTES NFR BLD: 12 % (ref 4–12)
MUCOUS THREADS URNS QL MICRO: 0 /LPF
NEUTS SEG # BLD: 1.7 K/UL (ref 1.7–8.2)
NEUTS SEG NFR BLD: 50 % (ref 43–78)
NITRITE UR QL STRIP.AUTO: NEGATIVE
NRBC # BLD: 0 K/UL (ref 0–0.2)
PH UR STRIP: 8 (ref 5–9)
PLATELET # BLD AUTO: 144 K/UL (ref 150–450)
PMV BLD AUTO: 11.9 FL (ref 9.4–12.3)
PROT UR STRIP-MCNC: NEGATIVE MG/DL
RBC # BLD AUTO: 4.98 M/UL (ref 4.23–5.6)
RBC #/AREA URNS HPF: NORMAL /HPF (ref 0–5)
SP GR UR REFRACTOMETRY: <1.005 (ref 1–1.02)
URINE CULTURE IF INDICATED: NORMAL
UROBILINOGEN UR QL STRIP.AUTO: 0.2 EU/DL (ref 0.2–1)
WBC # BLD AUTO: 3.4 K/UL (ref 4.3–11.1)
WBC URNS QL MICRO: NORMAL /HPF (ref 0–4)

## 2023-08-24 LAB
ALBUMIN SERPL-MCNC: 3.7 G/DL (ref 3.5–5)
ALBUMIN/GLOB SERPL: 1.5 (ref 0.4–1.6)
ALP SERPL-CCNC: 80 U/L (ref 50–136)
ALT SERPL-CCNC: 28 U/L (ref 12–65)
ANION GAP SERPL CALC-SCNC: 3 MMOL/L (ref 2–11)
AST SERPL-CCNC: 12 U/L (ref 15–37)
BILIRUB SERPL-MCNC: 0.6 MG/DL (ref 0.2–1.1)
BUN SERPL-MCNC: 9 MG/DL (ref 6–23)
CALCIUM SERPL-MCNC: 8.7 MG/DL (ref 8.3–10.4)
CHLORIDE SERPL-SCNC: 105 MMOL/L (ref 101–110)
CHOLEST SERPL-MCNC: 148 MG/DL
CO2 SERPL-SCNC: 32 MMOL/L (ref 21–32)
CREAT SERPL-MCNC: 1.1 MG/DL (ref 0.8–1.5)
GLOBULIN SER CALC-MCNC: 2.4 G/DL (ref 2.8–4.5)
GLUCOSE SERPL-MCNC: 95 MG/DL (ref 65–100)
HDLC SERPL-MCNC: 29 MG/DL (ref 40–60)
HDLC SERPL: 5.1
LDLC SERPL CALC-MCNC: 102 MG/DL
POTASSIUM SERPL-SCNC: 4.1 MMOL/L (ref 3.5–5.1)
PROT SERPL-MCNC: 6.1 G/DL (ref 6.3–8.2)
PSA SERPL-MCNC: 0.5 NG/ML
SODIUM SERPL-SCNC: 140 MMOL/L (ref 133–143)
TRIGL SERPL-MCNC: 85 MG/DL (ref 35–150)
TSH, 3RD GENERATION: 1.18 UIU/ML (ref 0.36–3.74)
VLDLC SERPL CALC-MCNC: 17 MG/DL (ref 6–23)

## 2023-08-28 ASSESSMENT — PATIENT HEALTH QUESTIONNAIRE - PHQ9
2. FEELING DOWN, DEPRESSED OR HOPELESS: NOT AT ALL
3. TROUBLE FALLING OR STAYING ASLEEP: NOT AT ALL
2. FEELING DOWN, DEPRESSED OR HOPELESS: 0
5. POOR APPETITE OR OVEREATING: NOT AT ALL
9. THOUGHTS THAT YOU WOULD BE BETTER OFF DEAD, OR OF HURTING YOURSELF: 0
6. FEELING BAD ABOUT YOURSELF - OR THAT YOU ARE A FAILURE OR HAVE LET YOURSELF OR YOUR FAMILY DOWN: NOT AT ALL
SUM OF ALL RESPONSES TO PHQ QUESTIONS 1-9: 1
1. LITTLE INTEREST OR PLEASURE IN DOING THINGS: 1
4. FEELING TIRED OR HAVING LITTLE ENERGY: 0
10. IF YOU CHECKED OFF ANY PROBLEMS, HOW DIFFICULT HAVE THESE PROBLEMS MADE IT FOR YOU TO DO YOUR WORK, TAKE CARE OF THINGS AT HOME, OR GET ALONG WITH OTHER PEOPLE: NOT DIFFICULT AT ALL
SUM OF ALL RESPONSES TO PHQ QUESTIONS 1-9: 1
8. MOVING OR SPEAKING SO SLOWLY THAT OTHER PEOPLE COULD HAVE NOTICED. OR THE OPPOSITE - BEING SO FIDGETY OR RESTLESS THAT YOU HAVE BEEN MOVING AROUND A LOT MORE THAN USUAL: NOT AT ALL
SUM OF ALL RESPONSES TO PHQ QUESTIONS 1-9: 1
8. MOVING OR SPEAKING SO SLOWLY THAT OTHER PEOPLE COULD HAVE NOTICED. OR THE OPPOSITE, BEING SO FIGETY OR RESTLESS THAT YOU HAVE BEEN MOVING AROUND A LOT MORE THAN USUAL: 0
1. LITTLE INTEREST OR PLEASURE IN DOING THINGS: SEVERAL DAYS
SUM OF ALL RESPONSES TO PHQ QUESTIONS 1-9: 1
9. THOUGHTS THAT YOU WOULD BE BETTER OFF DEAD, OR OF HURTING YOURSELF: NOT AT ALL
3. TROUBLE FALLING OR STAYING ASLEEP: 0
5. POOR APPETITE OR OVEREATING: 0
6. FEELING BAD ABOUT YOURSELF - OR THAT YOU ARE A FAILURE OR HAVE LET YOURSELF OR YOUR FAMILY DOWN: 0
7. TROUBLE CONCENTRATING ON THINGS, SUCH AS READING THE NEWSPAPER OR WATCHING TELEVISION: NOT AT ALL
SUM OF ALL RESPONSES TO PHQ9 QUESTIONS 1 & 2: 1
4. FEELING TIRED OR HAVING LITTLE ENERGY: NOT AT ALL
7. TROUBLE CONCENTRATING ON THINGS, SUCH AS READING THE NEWSPAPER OR WATCHING TELEVISION: 0
SUM OF ALL RESPONSES TO PHQ QUESTIONS 1-9: 1
10. IF YOU CHECKED OFF ANY PROBLEMS, HOW DIFFICULT HAVE THESE PROBLEMS MADE IT FOR YOU TO DO YOUR WORK, TAKE CARE OF THINGS AT HOME, OR GET ALONG WITH OTHER PEOPLE: 0

## 2023-08-31 ENCOUNTER — OFFICE VISIT (OUTPATIENT)
Dept: FAMILY MEDICINE CLINIC | Facility: CLINIC | Age: 58
End: 2023-08-31
Payer: COMMERCIAL

## 2023-08-31 VITALS
TEMPERATURE: 97.3 F | OXYGEN SATURATION: 97 % | WEIGHT: 243 LBS | DIASTOLIC BLOOD PRESSURE: 80 MMHG | BODY MASS INDEX: 32.2 KG/M2 | HEIGHT: 73 IN | HEART RATE: 73 BPM | SYSTOLIC BLOOD PRESSURE: 136 MMHG

## 2023-08-31 DIAGNOSIS — J30.1 SEASONAL ALLERGIC RHINITIS DUE TO POLLEN: ICD-10-CM

## 2023-08-31 DIAGNOSIS — R10.11 RUQ ABDOMINAL PAIN: ICD-10-CM

## 2023-08-31 DIAGNOSIS — Z00.00 ENCOUNTER FOR ANNUAL PHYSICAL EXAM: Primary | ICD-10-CM

## 2023-08-31 DIAGNOSIS — I10 PRIMARY HYPERTENSION: ICD-10-CM

## 2023-08-31 DIAGNOSIS — R93.1 ELEVATED CORONARY ARTERY CALCIUM SCORE: ICD-10-CM

## 2023-08-31 DIAGNOSIS — E78.5 HYPERLIPIDEMIA, UNSPECIFIED HYPERLIPIDEMIA TYPE: ICD-10-CM

## 2023-08-31 DIAGNOSIS — R11.0 SEVERE NAUSEA: ICD-10-CM

## 2023-08-31 PROBLEM — D68.9 COAGULATION DEFECT (HCC): Status: ACTIVE | Noted: 2023-08-31

## 2023-08-31 LAB
HEMOCCULT STL QL: NEGATIVE
VALID INTERNAL CONTROL: YES

## 2023-08-31 PROCEDURE — 99396 PREV VISIT EST AGE 40-64: CPT | Performed by: FAMILY MEDICINE

## 2023-08-31 PROCEDURE — 3078F DIAST BP <80 MM HG: CPT | Performed by: FAMILY MEDICINE

## 2023-08-31 PROCEDURE — 82272 OCCULT BLD FECES 1-3 TESTS: CPT | Performed by: FAMILY MEDICINE

## 2023-08-31 PROCEDURE — 3074F SYST BP LT 130 MM HG: CPT | Performed by: FAMILY MEDICINE

## 2023-08-31 RX ORDER — LOSARTAN POTASSIUM 100 MG/1
100 TABLET ORAL DAILY
Qty: 90 TABLET | Refills: 3 | Status: SHIPPED | OUTPATIENT
Start: 2023-08-31

## 2023-09-08 ENCOUNTER — HOSPITAL ENCOUNTER (OUTPATIENT)
Dept: ULTRASOUND IMAGING | Age: 58
Discharge: HOME | End: 2023-09-08
Payer: COMMERCIAL

## 2023-09-08 DIAGNOSIS — R11.0 SEVERE NAUSEA: ICD-10-CM

## 2023-09-08 DIAGNOSIS — R10.11 RUQ ABDOMINAL PAIN: ICD-10-CM

## 2023-09-08 PROCEDURE — 76700 US EXAM ABDOM COMPLETE: CPT

## 2023-09-09 ASSESSMENT — ENCOUNTER SYMPTOMS
CRAMPS: 1
RESPIRATORY NEGATIVE: 1
EYES NEGATIVE: 1
GASTROINTESTINAL NEGATIVE: 1

## 2023-09-19 DIAGNOSIS — R10.11 RUQ ABDOMINAL PAIN: Primary | ICD-10-CM

## 2023-09-19 DIAGNOSIS — R11.0 SEVERE NAUSEA: ICD-10-CM

## 2023-09-19 NOTE — PROGRESS NOTES
ORDER SUBMITTED   Abd Ultra Sound is normal so now we order a HIDA scan to r/o Gallbladder . Tyrone Chao please Order a HIDA scan to r/o Gallbladder dysfunction .  Clinical same as US

## 2024-08-19 RX ORDER — LOSARTAN POTASSIUM 100 MG/1
100 TABLET ORAL DAILY
Qty: 90 TABLET | Refills: 3 | OUTPATIENT
Start: 2024-08-19

## 2024-09-06 DIAGNOSIS — I10 ESSENTIAL (PRIMARY) HYPERTENSION: ICD-10-CM

## 2024-09-06 DIAGNOSIS — E78.5 ELEVATED LIPIDS: ICD-10-CM

## 2024-09-06 DIAGNOSIS — Z12.5 SCREENING PSA (PROSTATE SPECIFIC ANTIGEN): ICD-10-CM

## 2024-09-06 DIAGNOSIS — Z00.00 ENCOUNTER FOR ANNUAL PHYSICAL EXAM: ICD-10-CM

## 2024-09-06 DIAGNOSIS — Z13.89 SCREENING FOR BLOOD OR PROTEIN IN URINE: ICD-10-CM

## 2024-09-06 DIAGNOSIS — E78.5 HYPERLIPIDEMIA, UNSPECIFIED HYPERLIPIDEMIA TYPE: ICD-10-CM

## 2024-09-06 DIAGNOSIS — Z12.5 SCREENING PSA (PROSTATE SPECIFIC ANTIGEN): Primary | ICD-10-CM

## 2024-09-06 LAB
ALBUMIN SERPL-MCNC: 3.9 G/DL (ref 3.5–5)
ALBUMIN/GLOB SERPL: 1.5 (ref 1–1.9)
ALP SERPL-CCNC: 88 U/L (ref 40–129)
ALT SERPL-CCNC: 22 U/L (ref 12–65)
ANION GAP SERPL CALC-SCNC: 8 MMOL/L (ref 9–18)
APPEARANCE UR: CLEAR
AST SERPL-CCNC: 19 U/L (ref 15–37)
BACTERIA URNS QL MICRO: NEGATIVE /HPF
BASOPHILS # BLD: 0.1 K/UL (ref 0–0.2)
BASOPHILS NFR BLD: 1 % (ref 0–2)
BILIRUB SERPL-MCNC: 0.5 MG/DL (ref 0–1.2)
BILIRUB UR QL: NEGATIVE
BUN SERPL-MCNC: 12 MG/DL (ref 6–23)
CALCIUM SERPL-MCNC: 9.4 MG/DL (ref 8.8–10.2)
CASTS URNS QL MICRO: 0 /LPF
CHLORIDE SERPL-SCNC: 103 MMOL/L (ref 98–107)
CHOLEST SERPL-MCNC: 203 MG/DL (ref 0–200)
CO2 SERPL-SCNC: 29 MMOL/L (ref 20–28)
COLOR UR: NORMAL
CREAT SERPL-MCNC: 1.06 MG/DL (ref 0.8–1.3)
CRYSTALS URNS QL MICRO: 0 /LPF
DIFFERENTIAL METHOD BLD: ABNORMAL
EOSINOPHIL # BLD: 0.2 K/UL (ref 0–0.8)
EOSINOPHIL NFR BLD: 4 % (ref 0.5–7.8)
EPI CELLS #/AREA URNS HPF: NORMAL /HPF (ref 0–5)
ERYTHROCYTE [DISTWIDTH] IN BLOOD BY AUTOMATED COUNT: 12.3 % (ref 11.9–14.6)
GLOBULIN SER CALC-MCNC: 2.6 G/DL (ref 2.3–3.5)
GLUCOSE SERPL-MCNC: 101 MG/DL (ref 70–99)
GLUCOSE UR STRIP.AUTO-MCNC: NEGATIVE MG/DL
HCT VFR BLD AUTO: 47.8 % (ref 41.1–50.3)
HDLC SERPL-MCNC: 36 MG/DL (ref 40–60)
HDLC SERPL: 5.7 (ref 0–5)
HGB BLD-MCNC: 16.3 G/DL (ref 13.6–17.2)
HGB UR QL STRIP: NEGATIVE
HYALINE CASTS URNS QL MICRO: NORMAL /LPF
IMM GRANULOCYTES # BLD AUTO: 0 K/UL (ref 0–0.5)
IMM GRANULOCYTES NFR BLD AUTO: 0 % (ref 0–5)
KETONES UR QL STRIP.AUTO: NEGATIVE MG/DL
LDLC SERPL CALC-MCNC: 147 MG/DL (ref 0–100)
LEUKOCYTE ESTERASE UR QL STRIP.AUTO: NEGATIVE
LYMPHOCYTES # BLD: 1.2 K/UL (ref 0.5–4.6)
LYMPHOCYTES NFR BLD: 29 % (ref 13–44)
MCH RBC QN AUTO: 31.7 PG (ref 26.1–32.9)
MCHC RBC AUTO-ENTMCNC: 34.1 G/DL (ref 31.4–35)
MCV RBC AUTO: 93 FL (ref 82–102)
MONOCYTES # BLD: 0.5 K/UL (ref 0.1–1.3)
MONOCYTES NFR BLD: 12 % (ref 4–12)
MUCOUS THREADS URNS QL MICRO: 0 /LPF
NEUTS SEG # BLD: 2.2 K/UL (ref 1.7–8.2)
NEUTS SEG NFR BLD: 54 % (ref 43–78)
NITRITE UR QL STRIP.AUTO: NEGATIVE
NRBC # BLD: 0 K/UL (ref 0–0.2)
PH UR STRIP: 7.5 (ref 5–9)
PLATELET # BLD AUTO: 175 K/UL (ref 150–450)
PMV BLD AUTO: 11.2 FL (ref 9.4–12.3)
POTASSIUM SERPL-SCNC: 4.9 MMOL/L (ref 3.5–5.1)
PROT SERPL-MCNC: 6.5 G/DL (ref 6.3–8.2)
PROT UR STRIP-MCNC: NEGATIVE MG/DL
PSA SERPL-MCNC: 0.5 NG/ML (ref 0–4)
RBC # BLD AUTO: 5.14 M/UL (ref 4.23–5.6)
RBC #/AREA URNS HPF: NORMAL /HPF (ref 0–5)
SODIUM SERPL-SCNC: 141 MMOL/L (ref 136–145)
SP GR UR REFRACTOMETRY: <1.005 (ref 1–1.02)
TRIGL SERPL-MCNC: 102 MG/DL (ref 0–150)
TSH, 3RD GENERATION: 1.5 UIU/ML (ref 0.27–4.2)
URINE CULTURE IF INDICATED: NORMAL
UROBILINOGEN UR QL STRIP.AUTO: 0.2 EU/DL (ref 0.2–1)
VLDLC SERPL CALC-MCNC: 20 MG/DL (ref 6–23)
WBC # BLD AUTO: 4.2 K/UL (ref 4.3–11.1)
WBC URNS QL MICRO: NORMAL /HPF (ref 0–4)

## 2024-09-09 ASSESSMENT — PATIENT HEALTH QUESTIONNAIRE - PHQ9
4. FEELING TIRED OR HAVING LITTLE ENERGY: NOT AT ALL
10. IF YOU CHECKED OFF ANY PROBLEMS, HOW DIFFICULT HAVE THESE PROBLEMS MADE IT FOR YOU TO DO YOUR WORK, TAKE CARE OF THINGS AT HOME, OR GET ALONG WITH OTHER PEOPLE: NOT DIFFICULT AT ALL
3. TROUBLE FALLING OR STAYING ASLEEP: NOT AT ALL
4. FEELING TIRED OR HAVING LITTLE ENERGY: NOT AT ALL
6. FEELING BAD ABOUT YOURSELF - OR THAT YOU ARE A FAILURE OR HAVE LET YOURSELF OR YOUR FAMILY DOWN: NOT AT ALL
7. TROUBLE CONCENTRATING ON THINGS, SUCH AS READING THE NEWSPAPER OR WATCHING TELEVISION: NOT AT ALL
1. LITTLE INTEREST OR PLEASURE IN DOING THINGS: NOT AT ALL
9. THOUGHTS THAT YOU WOULD BE BETTER OFF DEAD, OR OF HURTING YOURSELF: NOT AT ALL
SUM OF ALL RESPONSES TO PHQ QUESTIONS 1-9: 0
SUM OF ALL RESPONSES TO PHQ QUESTIONS 1-9: 0
1. LITTLE INTEREST OR PLEASURE IN DOING THINGS: NOT AT ALL
10. IF YOU CHECKED OFF ANY PROBLEMS, HOW DIFFICULT HAVE THESE PROBLEMS MADE IT FOR YOU TO DO YOUR WORK, TAKE CARE OF THINGS AT HOME, OR GET ALONG WITH OTHER PEOPLE: NOT DIFFICULT AT ALL
SUM OF ALL RESPONSES TO PHQ QUESTIONS 1-9: 0
SUM OF ALL RESPONSES TO PHQ QUESTIONS 1-9: 0
2. FEELING DOWN, DEPRESSED OR HOPELESS: NOT AT ALL
5. POOR APPETITE OR OVEREATING: NOT AT ALL
3. TROUBLE FALLING OR STAYING ASLEEP: NOT AT ALL
SUM OF ALL RESPONSES TO PHQ9 QUESTIONS 1 & 2: 0
9. THOUGHTS THAT YOU WOULD BE BETTER OFF DEAD, OR OF HURTING YOURSELF: NOT AT ALL
8. MOVING OR SPEAKING SO SLOWLY THAT OTHER PEOPLE COULD HAVE NOTICED. OR THE OPPOSITE, BEING SO FIGETY OR RESTLESS THAT YOU HAVE BEEN MOVING AROUND A LOT MORE THAN USUAL: NOT AT ALL
8. MOVING OR SPEAKING SO SLOWLY THAT OTHER PEOPLE COULD HAVE NOTICED. OR THE OPPOSITE - BEING SO FIDGETY OR RESTLESS THAT YOU HAVE BEEN MOVING AROUND A LOT MORE THAN USUAL: NOT AT ALL
5. POOR APPETITE OR OVEREATING: NOT AT ALL
6. FEELING BAD ABOUT YOURSELF - OR THAT YOU ARE A FAILURE OR HAVE LET YOURSELF OR YOUR FAMILY DOWN: NOT AT ALL
2. FEELING DOWN, DEPRESSED OR HOPELESS: NOT AT ALL
7. TROUBLE CONCENTRATING ON THINGS, SUCH AS READING THE NEWSPAPER OR WATCHING TELEVISION: NOT AT ALL
SUM OF ALL RESPONSES TO PHQ QUESTIONS 1-9: 0

## 2024-09-12 ENCOUNTER — OFFICE VISIT (OUTPATIENT)
Dept: FAMILY MEDICINE CLINIC | Facility: CLINIC | Age: 59
End: 2024-09-12

## 2024-09-12 VITALS
SYSTOLIC BLOOD PRESSURE: 120 MMHG | TEMPERATURE: 97.7 F | BODY MASS INDEX: 34.25 KG/M2 | DIASTOLIC BLOOD PRESSURE: 86 MMHG | HEART RATE: 83 BPM | HEIGHT: 73 IN | WEIGHT: 258.4 LBS | OXYGEN SATURATION: 97 %

## 2024-09-12 DIAGNOSIS — J30.1 SEASONAL ALLERGIC RHINITIS DUE TO POLLEN: ICD-10-CM

## 2024-09-12 DIAGNOSIS — Z00.00 ENCOUNTER FOR ANNUAL PHYSICAL EXAM: ICD-10-CM

## 2024-09-12 DIAGNOSIS — I10 ESSENTIAL (PRIMARY) HYPERTENSION: ICD-10-CM

## 2024-09-12 DIAGNOSIS — R93.1 ELEVATED CORONARY ARTERY CALCIUM SCORE: Primary | ICD-10-CM

## 2024-09-12 RX ORDER — LOSARTAN POTASSIUM 100 MG/1
100 TABLET ORAL DAILY
Qty: 90 TABLET | Refills: 3 | Status: SHIPPED | OUTPATIENT
Start: 2024-09-12

## 2025-02-02 SDOH — HEALTH STABILITY: PHYSICAL HEALTH: ON AVERAGE, HOW MANY DAYS PER WEEK DO YOU ENGAGE IN MODERATE TO STRENUOUS EXERCISE (LIKE A BRISK WALK)?: 0 DAYS

## 2025-02-02 SDOH — HEALTH STABILITY: PHYSICAL HEALTH: ON AVERAGE, HOW MANY MINUTES DO YOU ENGAGE IN EXERCISE AT THIS LEVEL?: 0 MIN

## 2025-02-05 ENCOUNTER — OFFICE VISIT (OUTPATIENT)
Dept: FAMILY MEDICINE CLINIC | Facility: CLINIC | Age: 60
End: 2025-02-05
Payer: COMMERCIAL

## 2025-02-05 VITALS
BODY MASS INDEX: 34.59 KG/M2 | DIASTOLIC BLOOD PRESSURE: 88 MMHG | TEMPERATURE: 97.5 F | OXYGEN SATURATION: 98 % | WEIGHT: 261 LBS | SYSTOLIC BLOOD PRESSURE: 136 MMHG | HEIGHT: 73 IN | HEART RATE: 80 BPM

## 2025-02-05 DIAGNOSIS — I10 PRIMARY HYPERTENSION: Primary | ICD-10-CM

## 2025-02-05 PROBLEM — E29.1 TESTICULAR HYPOFUNCTION: Status: ACTIVE | Noted: 2025-02-05

## 2025-02-05 PROBLEM — N45.1 EPIDIDYMITIS: Status: RESOLVED | Noted: 2023-03-22 | Resolved: 2025-02-05

## 2025-02-05 PROCEDURE — 3075F SYST BP GE 130 - 139MM HG: CPT | Performed by: FAMILY MEDICINE

## 2025-02-05 PROCEDURE — 99213 OFFICE O/P EST LOW 20 MIN: CPT | Performed by: FAMILY MEDICINE

## 2025-02-05 PROCEDURE — 3079F DIAST BP 80-89 MM HG: CPT | Performed by: FAMILY MEDICINE

## 2025-02-05 RX ORDER — MULTIVITAMIN
1 TABLET ORAL DAILY
COMMUNITY

## 2025-02-05 RX ORDER — LOSARTAN POTASSIUM 100 MG/1
100 TABLET ORAL DAILY
Qty: 90 TABLET | Refills: 3 | Status: SHIPPED | OUTPATIENT
Start: 2025-02-05

## 2025-02-05 RX ORDER — METAPROTERENOL SULFATE 10 MG
1 TABLET ORAL DAILY
COMMUNITY

## 2025-02-05 SDOH — ECONOMIC STABILITY: FOOD INSECURITY: WITHIN THE PAST 12 MONTHS, YOU WORRIED THAT YOUR FOOD WOULD RUN OUT BEFORE YOU GOT MONEY TO BUY MORE.: NEVER TRUE

## 2025-02-05 SDOH — ECONOMIC STABILITY: FOOD INSECURITY: WITHIN THE PAST 12 MONTHS, THE FOOD YOU BOUGHT JUST DIDN'T LAST AND YOU DIDN'T HAVE MONEY TO GET MORE.: NEVER TRUE

## 2025-02-05 ASSESSMENT — ENCOUNTER SYMPTOMS
CONSTIPATION: 0
NAUSEA: 0
SORE THROAT: 0
DIARRHEA: 0
COUGH: 0
VOMITING: 0
SHORTNESS OF BREATH: 0
WHEEZING: 0

## 2025-02-05 ASSESSMENT — PATIENT HEALTH QUESTIONNAIRE - PHQ9
SUM OF ALL RESPONSES TO PHQ9 QUESTIONS 1 & 2: 0
7. TROUBLE CONCENTRATING ON THINGS, SUCH AS READING THE NEWSPAPER OR WATCHING TELEVISION: NOT AT ALL
5. POOR APPETITE OR OVEREATING: NOT AT ALL
SUM OF ALL RESPONSES TO PHQ QUESTIONS 1-9: 0
SUM OF ALL RESPONSES TO PHQ QUESTIONS 1-9: 0
1. LITTLE INTEREST OR PLEASURE IN DOING THINGS: NOT AT ALL
SUM OF ALL RESPONSES TO PHQ QUESTIONS 1-9: 0
3. TROUBLE FALLING OR STAYING ASLEEP: NOT AT ALL
SUM OF ALL RESPONSES TO PHQ QUESTIONS 1-9: 0
9. THOUGHTS THAT YOU WOULD BE BETTER OFF DEAD, OR OF HURTING YOURSELF: NOT AT ALL
6. FEELING BAD ABOUT YOURSELF - OR THAT YOU ARE A FAILURE OR HAVE LET YOURSELF OR YOUR FAMILY DOWN: NOT AT ALL
2. FEELING DOWN, DEPRESSED OR HOPELESS: NOT AT ALL
8. MOVING OR SPEAKING SO SLOWLY THAT OTHER PEOPLE COULD HAVE NOTICED. OR THE OPPOSITE, BEING SO FIGETY OR RESTLESS THAT YOU HAVE BEEN MOVING AROUND A LOT MORE THAN USUAL: NOT AT ALL
10. IF YOU CHECKED OFF ANY PROBLEMS, HOW DIFFICULT HAVE THESE PROBLEMS MADE IT FOR YOU TO DO YOUR WORK, TAKE CARE OF THINGS AT HOME, OR GET ALONG WITH OTHER PEOPLE: NOT DIFFICULT AT ALL
4. FEELING TIRED OR HAVING LITTLE ENERGY: NOT AT ALL

## 2025-02-05 NOTE — ASSESSMENT & PLAN NOTE
Chronic, at goal (stable), continue current treatment plan, medication adherence emphasized, and lifestyle modifications recommended    Orders:    losartan (COZAAR) 100 MG tablet; Take 1 tablet by mouth daily

## 2025-02-05 NOTE — PROGRESS NOTES
Venkata Hernandez (:  1965) is a 60 y.o. male,Established patient, here for evaluation of the following chief complaint(s):  Established New Doctor         Assessment & Plan  Primary hypertension   Chronic, at goal (stable), continue current treatment plan, medication adherence emphasized, and lifestyle modifications recommended    Orders:    losartan (COZAAR) 100 MG tablet; Take 1 tablet by mouth daily      No follow-ups on file.       Subjective   Essential hypertension-patient has a history of essential hypertension and is currently taking losartan 100 mg, 1 p.o. daily.  Patient's blood pressure is currently 138/88, and the patient's blood pressure has also been within normal limits at his last 2 office visits in 2024 and 2023.  Patient requested refills of losartan at today's encounter.  Patient denies any chest pain, shortness of breath, lightheadedness or dizziness.  Patient has had some issues with plantar fasciitis and Achilles tendinitis of his left foot which he feels is secondary to history of right knee surgery and the inability to fully extend his right knee.  Patient is currently following with podiatry for his symptoms and has received some corticosteroid injections with some relief.  Patient plans to resume his biking activities and trying to work on some weight loss to try to help with his chronic foot pain.        Review of Systems   Constitutional:  Negative for fever.   HENT:  Negative for congestion and sore throat.    Respiratory:  Negative for cough, shortness of breath and wheezing.    Cardiovascular:  Negative for chest pain and leg swelling.   Gastrointestinal:  Negative for constipation, diarrhea, nausea and vomiting.   Endocrine: Negative for polydipsia and polyuria.   Genitourinary:  Negative for difficulty urinating.   Musculoskeletal:  Positive for arthralgias. Negative for gait problem.   Neurological:  Negative for headaches.   Psychiatric/Behavioral:

## 2025-06-02 ENCOUNTER — OFFICE VISIT (OUTPATIENT)
Dept: ORTHOPEDIC SURGERY | Age: 60
End: 2025-06-02
Payer: COMMERCIAL

## 2025-06-02 DIAGNOSIS — M79.672 LEFT FOOT PAIN: ICD-10-CM

## 2025-06-02 DIAGNOSIS — M72.2 PLANTAR FASCIITIS, LEFT: Primary | ICD-10-CM

## 2025-06-02 DIAGNOSIS — G57.52 TARSAL TUNNEL SYNDROME OF LEFT SIDE: ICD-10-CM

## 2025-06-02 PROCEDURE — E0114 CRUTCH UNDERARM PAIR NO WOOD: HCPCS | Performed by: ORTHOPAEDIC SURGERY

## 2025-06-02 PROCEDURE — 99204 OFFICE O/P NEW MOD 45 MIN: CPT | Performed by: ORTHOPAEDIC SURGERY

## 2025-06-02 NOTE — PROGRESS NOTES
Name: Venkata Hernandez  YOB: 1965  Gender: male  MRN: [id number]    Summary:   Left plantar fascia fasciitis and distal tarsal tunnel syndrome with Baxters nerve entrapment       CC: New Patient (Left foot, xray obtained in office.)       HPI: Venkata Hernandez is a 60 y.o. male who presents with New Patient (Left foot, xray obtained in office.)  .  This patient presents the office with a several year history of pain located the plantar aspect of his left heel.  Had an MRI performed in 2023 which showed plantar fasciitis without evidence of space-occupying mass.  He has had ongoing persistent pain affecting his activity living for the last 2 years including multiple injections with minimal relief.    History was obtained by Patient     ROS/Meds/PSH/PMH/FH/SH: I personally reviewed the patients standard intake form.  Below are the pertinents    Tobacco:  reports that he quit smoking about 39 years ago. He has never used smokeless tobacco.  Diabetes: None      Physical Examination:    Exam of the left lower extremity shows tenderness palpation of the plantar medial heel.  No sign of fascial insufficiency is noted.  There is a positive Tinel's over the distal tarsal tunnel.  He has palpable pulses and intact sensation.    Imaging:   Interpretation of imaging  Left foot XR: AP, Lateral, Oblique views     ICD-10-CM    1. Left foot pain  M79.672 XR FOOT LEFT (MIN 3 VIEWS)      2. Plantar fasciitis, left  M72.2       3. Tarsal tunnel syndrome of left side  G57.52          Report: AP, lateral, oblique x-ray of the left foot demonstrates no fracture    Impression: No fracture   REESE ESCOBAR III, MD           Assessment:   Left plantar fasciitis with tarsal tunnel syndrome and Baxters nerve entrapment    Treatment Plan:   4 This is a chronic illness/condition with exacerbation and progression  Treatment at this time: Elective major surgery with procedural risk factors  Studies ordered: NO XR needed @

## 2025-06-02 NOTE — PROGRESS NOTES
Patient was fitted and instruted on the use of a crutches. The crutches  was adjusted to the patient's height and arm length. Patient walked around with the crutches  to insure it was set at a comfortable height. I explained that patient needs tennis shoes on when using the crutches to insure no injury's .     Patient read and signed documenting they understand and agree to [facility]'s current DME return policy.

## 2025-06-06 ENCOUNTER — OUTSIDE SERVICES (OUTPATIENT)
Dept: ORTHOPEDIC SURGERY | Age: 60
End: 2025-06-06

## 2025-06-06 DIAGNOSIS — G89.18 ACUTE POST-OPERATIVE PAIN: Primary | ICD-10-CM

## 2025-06-06 DIAGNOSIS — M72.2 PLANTAR FASCIITIS, LEFT: ICD-10-CM

## 2025-06-06 DIAGNOSIS — G57.52 TARSAL TUNNEL SYNDROME OF LEFT SIDE: ICD-10-CM

## 2025-06-06 RX ORDER — OXYCODONE HYDROCHLORIDE 5 MG/1
5 TABLET ORAL EVERY 6 HOURS PRN
Qty: 20 TABLET | Refills: 0 | Status: SHIPPED | OUTPATIENT
Start: 2025-06-06 | End: 2025-06-06

## 2025-06-06 RX ORDER — CEPHALEXIN 500 MG/1
500 CAPSULE ORAL 4 TIMES DAILY
Qty: 12 CAPSULE | Refills: 0 | Status: SHIPPED | OUTPATIENT
Start: 2025-06-06

## 2025-06-06 RX ORDER — ASPIRIN 81 MG/1
81 TABLET ORAL 2 TIMES DAILY
Qty: 120 TABLET | Refills: 0 | Status: SHIPPED | OUTPATIENT
Start: 2025-06-06

## 2025-06-06 RX ORDER — TRAMADOL HYDROCHLORIDE 50 MG/1
50 TABLET ORAL EVERY 6 HOURS PRN
Qty: 20 TABLET | Refills: 0 | Status: SHIPPED | OUTPATIENT
Start: 2025-06-06 | End: 2025-06-11

## 2025-06-06 NOTE — OP NOTE
Operative Note    Patient:Venkata Hernandez  MRN: [id number]    Date Of Surgery: 6/6/2025    Surgeon: Berry Hutton MD    Assistant Surgeon: None    Pre Op Diagnosis:  Left Plantar fasciitis with tarsal tunnel syndrome      Post Op Diagnosis:   same    Procedures Performed:  Left open plantar fascia release, 59169  Left tarsal tunnel release, 98724        Anesthesia:  * No surgery found *    Blood Loss:  minimal    Tourniquet:  Estimated calf 20 minutes    Pre Operative Abx:   Ancef 2g            Pre Operative Course:  Venkata Hernandez is a 60 y.o. male who has a history of left chronic plantar fasciitis with tarsal tunnel syndrome.    Operation In Detail:  Patient was evaluated in the preoperative area.  We had a long discussion about the procedure and postoperative protocols.  The patient was then brought back to the operating room suite and placed in the operating room table.  A timeout was taken to identify the patient, procedure being performed, and laterality.  After this the patient was prepped and draped in the normal sterile fashion using a Betadine solution and/or a ChloraPrep solution.  A timeout was then taken to identify the patient his name, date of birth, laterality, and procedure being performed.  We also identified allergies and any concerns about the operation.  Attention was then placed to the operative extremity.  A block was placed by the department of anesthesia.  In a preop surgical timeout the left lower extremity was identified as the correct surgical site and prepped and draped in the standard sterile fashion ChloraPrep solution.  A medial approach the ankle was then opened from the level of the medial malleolus down to the glabrous border of the skin.  Blunt dissection was carried down at that time.  The superficial fascia the abductor houses muscle belly was then released as well as the deep fascia to release the distal aspect of the tarsal tunnel.  The abductor hallucis was then

## 2025-06-23 ENCOUNTER — OFFICE VISIT (OUTPATIENT)
Dept: ORTHOPEDIC SURGERY | Age: 60
End: 2025-06-23
Payer: COMMERCIAL

## 2025-06-23 DIAGNOSIS — G57.52 TARSAL TUNNEL SYNDROME OF LEFT SIDE: ICD-10-CM

## 2025-06-23 DIAGNOSIS — M72.2 PLANTAR FASCIITIS, LEFT: Primary | ICD-10-CM

## 2025-06-23 PROCEDURE — 99024 POSTOP FOLLOW-UP VISIT: CPT | Performed by: NURSE PRACTITIONER

## 2025-06-23 PROCEDURE — L4361 PNEUMA/VAC WALK BOOT PRE OTS: HCPCS | Performed by: NURSE PRACTITIONER

## 2025-06-23 NOTE — PROGRESS NOTES
Name: Venkata Hernandez  YOB: 1965  Gender: male  MRN: [id number]    Procedure Performed:  Left open plantar fascia release  Left tarsal tunnel release      Date of Procedure: 06/06/2025      Subjective: Patient reports overall that he is doing really well.  He is happy about progressing with his recovery.  He is ready to return to some normal activities in his life.      Physical Examination: The incisional area to the medial side of the heel appears to be healing well and does not show any signs of infection.  He has a negative Tinel's sign over the tarsal tunnel.  He has palpable pulses and intact sensation of the foot.  He wiggles his toes affected without pain.  He has no signs of DVT at this time, denies any calf or behind the knee pain and does present with a negative Homans' sign.  His capillary refill appears to be within normal limits.  There is expected swelling to the area.  He does have some residual ecchymosis present.  He does have some point tenderness and discomfort with palpation to the bottom of the heel.  He has no pain when tracing the plantar fascia cord or with pressure and palpation applied to the plantar fascia insertional point of the heel.        Imaging:   No imaging reviewed          Assessment:   Status post left open plantar fascia release with tarsal tunnel release.  We discussed progression of care today as well as expectations until next follow-up visit.  Question concerns were addressed, he verbalized understanding of today's conversation.      Plan:   3 This is stable chronic illness/condition  Treatment at this time: Sutures removed, Steri-Strips were placed.  Patient will be placed into a cam walker boot as a matter medical necessity where he may progressively bear weight on the affected extremity he can tolerate and swelling allows. The affected extremity may now get wet including showering, encouragement was given for the patient to soak with Epsom salts to

## 2025-06-23 NOTE — PROGRESS NOTES
The patient was prescribed a walker boot for the patient's left foot. The patient wears a size 13 shoe and I fitted them with a L size boot. The patient was fitted and instructed on the use of prescribed walker boot by a Registered Orthopedic Technician. I explained how to fit themselves and that the plastic flexible piece should always be on the front of the boot and secured by the Velcro straps on top. The air bladder in the boot was adjusted according to proper fit and comfort. The patient walked a short distance and acknowledged satisfaction with current fit. I also explained that they need a heel lift or a higher heeled shoe for the uninvolved LE to help normalize gait and avoid excessive low back stress/strain due to leg length inequality created from walker boot.    Patient read and signed documenting they understand and agree to [facility]'s current DME return policy.

## 2025-07-21 ENCOUNTER — OFFICE VISIT (OUTPATIENT)
Dept: ORTHOPEDIC SURGERY | Age: 60
End: 2025-07-21

## 2025-07-21 DIAGNOSIS — G57.52 TARSAL TUNNEL SYNDROME OF LEFT SIDE: Primary | ICD-10-CM

## 2025-07-21 DIAGNOSIS — M72.2 PLANTAR FASCIITIS, LEFT: ICD-10-CM

## 2025-07-21 PROCEDURE — 99024 POSTOP FOLLOW-UP VISIT: CPT | Performed by: NURSE PRACTITIONER

## 2025-07-21 RX ORDER — METHYLPREDNISOLONE 4 MG/1
TABLET ORAL
Qty: 1 KIT | Refills: 0 | Status: SHIPPED | OUTPATIENT
Start: 2025-07-21 | End: 2025-07-27

## 2025-07-21 NOTE — PROGRESS NOTES
this time.  He will begin a Dosepak of steroids to hopefully help with healing and cut inflammation.  The patient will follow-up in approximately 6 weeks or sooner if needed.    Studies ordered: NO XR needed @ Next Visit    Weight-bearing status: WBAT        Return to work/work restrictions: none  Medrol Dose pack (6 day oral taper):  I discussed medrol being a steroid and how it can elevate blood sugars.  I recommended to monitor sugars closely and to beware of symptoms such as lethargy, excessive thirst, polyuria, and GI distress.  We also discussed the dose pack taper format and how long term steroid use can alter adrenal function.  I also discussed steroids effect on depression and mood.  How in some people it can exacerbate underlying depression while in others create a more manic response.